# Patient Record
Sex: FEMALE | URBAN - METROPOLITAN AREA
[De-identification: names, ages, dates, MRNs, and addresses within clinical notes are randomized per-mention and may not be internally consistent; named-entity substitution may affect disease eponyms.]

---

## 2017-02-24 PROBLEM — Z00.00 ENCOUNTER FOR PREVENTIVE HEALTH EXAMINATION: Status: ACTIVE | Noted: 2017-02-24

## 2017-09-29 ENCOUNTER — INPATIENT (INPATIENT)
Facility: HOSPITAL | Age: 52
LOS: 4 days | Discharge: HOME | End: 2017-10-04
Attending: HOSPITALIST | Admitting: HOSPITALIST
Payer: MEDICAID

## 2017-09-29 VITALS
OXYGEN SATURATION: 95 % | SYSTOLIC BLOOD PRESSURE: 136 MMHG | WEIGHT: 110.01 LBS | RESPIRATION RATE: 17 BRPM | HEART RATE: 97 BPM | HEIGHT: 64 IN | DIASTOLIC BLOOD PRESSURE: 94 MMHG

## 2017-09-29 LAB
ALBUMIN SERPL ELPH-MCNC: 3.8 G/DL — SIGNIFICANT CHANGE UP (ref 3.3–5)
ALP SERPL-CCNC: 71 U/L — SIGNIFICANT CHANGE UP (ref 40–120)
ALT FLD-CCNC: 18 U/L — SIGNIFICANT CHANGE UP (ref 12–78)
ANION GAP SERPL CALC-SCNC: 9 MMOL/L — SIGNIFICANT CHANGE UP (ref 5–17)
APPEARANCE UR: CLEAR — SIGNIFICANT CHANGE UP
APTT BLD: 30.1 SEC — SIGNIFICANT CHANGE UP (ref 27.5–37.4)
AST SERPL-CCNC: 19 U/L — SIGNIFICANT CHANGE UP (ref 15–37)
BASOPHILS # BLD AUTO: 0 K/UL — SIGNIFICANT CHANGE UP (ref 0–0.2)
BASOPHILS NFR BLD AUTO: 0.4 % — SIGNIFICANT CHANGE UP (ref 0–2)
BILIRUB SERPL-MCNC: 0.3 MG/DL — SIGNIFICANT CHANGE UP (ref 0.2–1.2)
BILIRUB UR-MCNC: NEGATIVE — SIGNIFICANT CHANGE UP
BUN SERPL-MCNC: 11 MG/DL — SIGNIFICANT CHANGE UP (ref 7–23)
CALCIUM SERPL-MCNC: 8.7 MG/DL — SIGNIFICANT CHANGE UP (ref 8.5–10.1)
CHLORIDE SERPL-SCNC: 110 MMOL/L — HIGH (ref 96–108)
CK MB BLD-MCNC: 1.4 % — SIGNIFICANT CHANGE UP (ref 0–3.5)
CK MB CFR SERPL CALC: 1 NG/ML — SIGNIFICANT CHANGE UP (ref 0.5–3.6)
CK SERPL-CCNC: 74 U/L — SIGNIFICANT CHANGE UP (ref 26–192)
CO2 SERPL-SCNC: 27 MMOL/L — SIGNIFICANT CHANGE UP (ref 22–31)
COLOR SPEC: YELLOW — SIGNIFICANT CHANGE UP
CREAT SERPL-MCNC: 1.13 MG/DL — SIGNIFICANT CHANGE UP (ref 0.5–1.3)
DIFF PNL FLD: ABNORMAL
EOSINOPHIL # BLD AUTO: 0 K/UL — SIGNIFICANT CHANGE UP (ref 0–0.5)
EOSINOPHIL NFR BLD AUTO: 0.2 % — SIGNIFICANT CHANGE UP (ref 0–6)
GLUCOSE SERPL-MCNC: 78 MG/DL — SIGNIFICANT CHANGE UP (ref 70–99)
GLUCOSE UR QL: 50 MG/DL
HCT VFR BLD CALC: 39 % — SIGNIFICANT CHANGE UP (ref 34.5–45)
HGB BLD-MCNC: 13.1 G/DL — SIGNIFICANT CHANGE UP (ref 11.5–15.5)
INR BLD: 1.17 RATIO — HIGH (ref 0.88–1.16)
KETONES UR-MCNC: ABNORMAL
LACTATE SERPL-SCNC: 4.2 MMOL/L — CRITICAL HIGH (ref 0.7–2)
LEUKOCYTE ESTERASE UR-ACNC: NEGATIVE — SIGNIFICANT CHANGE UP
LYMPHOCYTES # BLD AUTO: 0.6 K/UL — LOW (ref 1–3.3)
LYMPHOCYTES # BLD AUTO: 5.9 % — LOW (ref 13–44)
MCHC RBC-ENTMCNC: 31.8 PG — SIGNIFICANT CHANGE UP (ref 27–34)
MCHC RBC-ENTMCNC: 33.4 GM/DL — SIGNIFICANT CHANGE UP (ref 32–36)
MCV RBC AUTO: 95 FL — SIGNIFICANT CHANGE UP (ref 80–100)
MONOCYTES # BLD AUTO: 0.6 K/UL — SIGNIFICANT CHANGE UP (ref 0–0.9)
MONOCYTES NFR BLD AUTO: 6.1 % — SIGNIFICANT CHANGE UP (ref 2–14)
NEUTROPHILS # BLD AUTO: 9.3 K/UL — HIGH (ref 1.8–7.4)
NEUTROPHILS NFR BLD AUTO: 87.4 % — HIGH (ref 43–77)
NITRITE UR-MCNC: NEGATIVE — SIGNIFICANT CHANGE UP
PH UR: 7 — SIGNIFICANT CHANGE UP (ref 5–8)
PLATELET # BLD AUTO: 263 K/UL — SIGNIFICANT CHANGE UP (ref 150–400)
POTASSIUM SERPL-MCNC: 2.8 MMOL/L — CRITICAL LOW (ref 3.5–5.3)
POTASSIUM SERPL-SCNC: 2.8 MMOL/L — CRITICAL LOW (ref 3.5–5.3)
PROT SERPL-MCNC: 7.4 GM/DL — SIGNIFICANT CHANGE UP (ref 6–8.3)
PROT UR-MCNC: 100 MG/DL
PROTHROM AB SERPL-ACNC: 12.8 SEC — HIGH (ref 9.8–12.7)
RBC # BLD: 4.11 M/UL — SIGNIFICANT CHANGE UP (ref 3.8–5.2)
RBC # FLD: 12.2 % — SIGNIFICANT CHANGE UP (ref 11–15)
SODIUM SERPL-SCNC: 146 MMOL/L — HIGH (ref 135–145)
SP GR SPEC: 1.01 — SIGNIFICANT CHANGE UP (ref 1.01–1.02)
TROPONIN I SERPL-MCNC: 0.19 NG/ML — HIGH (ref 0.01–0.04)
UROBILINOGEN FLD QL: NEGATIVE MG/DL — SIGNIFICANT CHANGE UP
WBC # BLD: 10.7 K/UL — HIGH (ref 3.8–10.5)
WBC # FLD AUTO: 10.7 K/UL — HIGH (ref 3.8–10.5)

## 2017-09-29 PROCEDURE — 99285 EMERGENCY DEPT VISIT HI MDM: CPT

## 2017-09-29 PROCEDURE — 71010: CPT | Mod: 26

## 2017-09-29 PROCEDURE — 70450 CT HEAD/BRAIN W/O DYE: CPT | Mod: 26

## 2017-09-29 RX ORDER — LEVETIRACETAM 250 MG/1
1000 TABLET, FILM COATED ORAL ONCE
Refills: 0 | Status: COMPLETED | OUTPATIENT
Start: 2017-09-29 | End: 2017-09-29

## 2017-09-29 RX ORDER — POTASSIUM CHLORIDE 20 MEQ
20 PACKET (EA) ORAL ONCE
Refills: 0 | Status: COMPLETED | OUTPATIENT
Start: 2017-09-29 | End: 2017-09-29

## 2017-09-29 RX ORDER — POTASSIUM CHLORIDE 20 MEQ
40 PACKET (EA) ORAL ONCE
Refills: 0 | Status: COMPLETED | OUTPATIENT
Start: 2017-09-29 | End: 2017-09-29

## 2017-09-29 RX ORDER — SODIUM CHLORIDE 9 MG/ML
1000 INJECTION INTRAMUSCULAR; INTRAVENOUS; SUBCUTANEOUS ONCE
Refills: 0 | Status: COMPLETED | OUTPATIENT
Start: 2017-09-29 | End: 2017-09-29

## 2017-09-29 RX ADMIN — Medication 50 MILLIEQUIVALENT(S): at 22:37

## 2017-09-29 RX ADMIN — SODIUM CHLORIDE 1000 MILLILITER(S): 9 INJECTION INTRAMUSCULAR; INTRAVENOUS; SUBCUTANEOUS at 21:23

## 2017-09-29 RX ADMIN — Medication 40 MILLIEQUIVALENT(S): at 22:37

## 2017-09-29 RX ADMIN — LEVETIRACETAM 400 MILLIGRAM(S): 250 TABLET, FILM COATED ORAL at 22:29

## 2017-09-29 NOTE — ED PROVIDER NOTE - OBJECTIVE STATEMENT
Pertinent PMH/PSH/FHx/SHx and Review of Systems contained within:  51 yo f with PMH of HTN and seizures BIBEMS for seizures today. Seizure witnessed by EMS and she received versed. In ED, patient post-ictal and not answering questions. Pertinent PMH/PSH/FHx/SHx and Review of Systems contained within:  53 yo f with PMH of hemorrhagic CVA with right sided weakness, HTN and seizures BIBEMS for seizures today. As per daughter, seizure lasted longer than 15 mintues until she received versed by EMS. Daughter reports that patient always has extended postictal period with right sided facial droop that eventual resolves. In ED, patient reports feeling well and has no complaints. No aggravating or relieving factors, No fever/chills, No photophobia/eye pain/changes in vision, No ear pain/sore throat/dysphagia, No chest pain/palpitations, no SOB/cough/wheeze/stridor, No abdominal pain, No N/V/D, no dysuria/frequency/discharge, No neck/back pain, no rash

## 2017-09-29 NOTE — ED PROVIDER NOTE - MEDICAL DECISION MAKING DETAILS
labs and imaging reviewed. patient received ivfs and keppra. she is now admitted to medicine for further management.

## 2017-09-30 DIAGNOSIS — Z98.890 OTHER SPECIFIED POSTPROCEDURAL STATES: Chronic | ICD-10-CM

## 2017-09-30 DIAGNOSIS — Z87.74 PERSONAL HISTORY OF (CORRECTED) CONGENITAL MALFORMATIONS OF HEART AND CIRCULATORY SYSTEM: ICD-10-CM

## 2017-09-30 DIAGNOSIS — G83.84 TODD'S PARALYSIS (POSTEPILEPTIC): ICD-10-CM

## 2017-09-30 DIAGNOSIS — I69.392 FACIAL WEAKNESS FOLLOWING CEREBRAL INFARCTION: ICD-10-CM

## 2017-09-30 DIAGNOSIS — R56.9 UNSPECIFIED CONVULSIONS: ICD-10-CM

## 2017-09-30 DIAGNOSIS — G40.901 EPILEPSY, UNSPECIFIED, NOT INTRACTABLE, WITH STATUS EPILEPTICUS: ICD-10-CM

## 2017-09-30 DIAGNOSIS — Z98.891 HISTORY OF UTERINE SCAR FROM PREVIOUS SURGERY: Chronic | ICD-10-CM

## 2017-09-30 DIAGNOSIS — I10 ESSENTIAL (PRIMARY) HYPERTENSION: ICD-10-CM

## 2017-09-30 LAB
ALBUMIN SERPL ELPH-MCNC: 3.6 G/DL — SIGNIFICANT CHANGE UP (ref 3.3–5)
ALP SERPL-CCNC: 69 U/L — SIGNIFICANT CHANGE UP (ref 40–120)
ALT FLD-CCNC: 16 U/L — SIGNIFICANT CHANGE UP (ref 12–78)
ANION GAP SERPL CALC-SCNC: 6 MMOL/L — SIGNIFICANT CHANGE UP (ref 5–17)
APPEARANCE UR: ABNORMAL
AST SERPL-CCNC: 18 U/L — SIGNIFICANT CHANGE UP (ref 15–37)
BACTERIA # UR AUTO: ABNORMAL
BACTERIA # UR AUTO: ABNORMAL
BILIRUB SERPL-MCNC: 0.5 MG/DL — SIGNIFICANT CHANGE UP (ref 0.2–1.2)
BILIRUB UR-MCNC: ABNORMAL
BUN SERPL-MCNC: 9 MG/DL — SIGNIFICANT CHANGE UP (ref 7–23)
CALCIUM SERPL-MCNC: 8.3 MG/DL — LOW (ref 8.5–10.1)
CHLORIDE SERPL-SCNC: 111 MMOL/L — HIGH (ref 96–108)
CO2 SERPL-SCNC: 28 MMOL/L — SIGNIFICANT CHANGE UP (ref 22–31)
COLOR SPEC: YELLOW — SIGNIFICANT CHANGE UP
COMMENT - URINE: SIGNIFICANT CHANGE UP
CREAT SERPL-MCNC: 0.84 MG/DL — SIGNIFICANT CHANGE UP (ref 0.5–1.3)
DIFF PNL FLD: ABNORMAL
EPI CELLS # UR: ABNORMAL
EPI CELLS # UR: ABNORMAL
GLUCOSE SERPL-MCNC: 85 MG/DL — SIGNIFICANT CHANGE UP (ref 70–99)
GLUCOSE UR QL: NEGATIVE MG/DL — SIGNIFICANT CHANGE UP
HCT VFR BLD CALC: 40 % — SIGNIFICANT CHANGE UP (ref 34.5–45)
HGB BLD-MCNC: 13.3 G/DL — SIGNIFICANT CHANGE UP (ref 11.5–15.5)
HYALINE CASTS # UR AUTO: ABNORMAL /LPF
KETONES UR-MCNC: ABNORMAL
LACTATE SERPL-SCNC: 1.4 MMOL/L — SIGNIFICANT CHANGE UP (ref 0.7–2)
LEUKOCYTE ESTERASE UR-ACNC: ABNORMAL
MCHC RBC-ENTMCNC: 32 PG — SIGNIFICANT CHANGE UP (ref 27–34)
MCHC RBC-ENTMCNC: 33.2 GM/DL — SIGNIFICANT CHANGE UP (ref 32–36)
MCV RBC AUTO: 96.4 FL — SIGNIFICANT CHANGE UP (ref 80–100)
NITRITE UR-MCNC: NEGATIVE — SIGNIFICANT CHANGE UP
PH UR: 6 — SIGNIFICANT CHANGE UP (ref 5–8)
PLATELET # BLD AUTO: 245 K/UL — SIGNIFICANT CHANGE UP (ref 150–400)
POTASSIUM SERPL-MCNC: 3.5 MMOL/L — SIGNIFICANT CHANGE UP (ref 3.5–5.3)
POTASSIUM SERPL-SCNC: 3.5 MMOL/L — SIGNIFICANT CHANGE UP (ref 3.5–5.3)
PROT SERPL-MCNC: 7.1 GM/DL — SIGNIFICANT CHANGE UP (ref 6–8.3)
PROT UR-MCNC: 30 MG/DL
RBC # BLD: 4.15 M/UL — SIGNIFICANT CHANGE UP (ref 3.8–5.2)
RBC # FLD: 12.4 % — SIGNIFICANT CHANGE UP (ref 11–15)
RBC CASTS # UR COMP ASSIST: ABNORMAL /HPF (ref 0–4)
RBC CASTS # UR COMP ASSIST: ABNORMAL /HPF (ref 0–4)
SODIUM SERPL-SCNC: 145 MMOL/L — SIGNIFICANT CHANGE UP (ref 135–145)
SP GR SPEC: 1.02 — SIGNIFICANT CHANGE UP (ref 1.01–1.02)
TROPONIN I SERPL-MCNC: 0.38 NG/ML — HIGH (ref 0.01–0.04)
TROPONIN I SERPL-MCNC: 0.61 NG/ML — HIGH (ref 0.01–0.04)
UROBILINOGEN FLD QL: 4 MG/DL
WBC # BLD: 15.5 K/UL — HIGH (ref 3.8–10.5)
WBC # FLD AUTO: 15.5 K/UL — HIGH (ref 3.8–10.5)
WBC UR QL: SIGNIFICANT CHANGE UP
WBC UR QL: SIGNIFICANT CHANGE UP

## 2017-09-30 PROCEDURE — 99222 1ST HOSP IP/OBS MODERATE 55: CPT | Mod: AI

## 2017-09-30 RX ORDER — ASPIRIN/CALCIUM CARB/MAGNESIUM 324 MG
325 TABLET ORAL ONCE
Refills: 0 | Status: COMPLETED | OUTPATIENT
Start: 2017-09-30 | End: 2017-09-30

## 2017-09-30 RX ORDER — INFLUENZA VIRUS VACCINE 15; 15; 15; 15 UG/.5ML; UG/.5ML; UG/.5ML; UG/.5ML
0.5 SUSPENSION INTRAMUSCULAR ONCE
Refills: 0 | Status: COMPLETED | OUTPATIENT
Start: 2017-09-30 | End: 2017-10-04

## 2017-09-30 RX ORDER — LEVETIRACETAM 250 MG/1
750 TABLET, FILM COATED ORAL EVERY 12 HOURS
Refills: 0 | Status: DISCONTINUED | OUTPATIENT
Start: 2017-09-30 | End: 2017-10-02

## 2017-09-30 RX ORDER — ASPIRIN/CALCIUM CARB/MAGNESIUM 324 MG
81 TABLET ORAL DAILY
Refills: 0 | Status: DISCONTINUED | OUTPATIENT
Start: 2017-10-01 | End: 2017-10-04

## 2017-09-30 RX ADMIN — LEVETIRACETAM 400 MILLIGRAM(S): 250 TABLET, FILM COATED ORAL at 07:02

## 2017-09-30 RX ADMIN — Medication 325 MILLIGRAM(S): at 07:56

## 2017-09-30 RX ADMIN — LEVETIRACETAM 400 MILLIGRAM(S): 250 TABLET, FILM COATED ORAL at 17:19

## 2017-09-30 NOTE — CONSULT NOTE ADULT - ASSESSMENT
consult dict awake alert left side aneurysm clipped seziure on keppra  c head noted  r sided weakness   will follow

## 2017-09-30 NOTE — H&P ADULT - ASSESSMENT
52 YOF with PMHx of seizure, HTN, hemorrhagic CVA with right sided weakness, history of AVM (with craniotomy) now admitted with seizures.

## 2017-09-30 NOTE — H&P ADULT - HISTORY OF PRESENT ILLNESS
52 YOF with PMHx of HTN, hemorrhagic CVA with right sided weakness, history of AVM (with craniotomy) and history of seizures BIB EMS for one episode of seizure. As per chart the daughter stated seizure lasted longer than 15 minutes until she medicated with Versed by EMS. Daughter reports that patient always has extended postictal period with right sided facial droop that eventual resolves. Pt now post ictal, lethargic, unable to obtain full history from pt, continues to go in and out of sleep. However states she has no complaints. States last seizure episode was about 3-4months ago. Otherwise denies cp, sob, palpitations, N/V, abd pain, headache, dizziness.    In ED labs show hypokalemia: 2.8. Repleted. Elevated Lactate: 4.2. Given 1L bolus. Troponin: 0.192. UA with moderate bacteria and ketones. CT Head negative for acute changes, left craniotomy. Keppra 1g given in ED. CXR pending.    ED attending Dr. Staton d/w Dr. Del Rio who admitted pt.

## 2017-09-30 NOTE — H&P ADULT - PMH
CVA, old, facial weakness  Right sided weakness  History of arteriovenous malformation (AVM)  s/p craniotomy  HTN (hypertension)    Seizure

## 2017-09-30 NOTE — H&P ADULT - PROBLEM SELECTOR PLAN 1
- Admit to Telemetry  - Seizure precautions  - Keppra 750mg IV BID  - Neurology consult  - F/u labs  - Repeat lactate @500  - Repeat Troponin @500/1300  - Keppra level pending  - Speech and Swallow

## 2017-09-30 NOTE — H&P ADULT - NSHPLABSRESULTS_GEN_ALL_CORE
13.1   10.7  )-----------( 263      ( 29 Sep 2017 21:25 )             39.0       146<H>  |  110<H>  |  11  ----------------------------<  78  2.8<LL>   |  27  |  1.13    Ca    8.7      29 Sep 2017 21:25    TPro  7.4  /  Alb  3.8  /  TBili  0.3  /  DBili  x   /  AST  19  /  ALT  18  /  AlkPhos  71      PT/INR - ( 29 Sep 2017 21:25 )   PT: 12.8 sec;   INR: 1.17 ratio      PTT - ( 29 Sep 2017 21:25 )  PTT:30.1 sec    Lactate, Blood: 4.2    CARDIAC MARKERS ( 29 Sep 2017 21:25 )  .192 ng/mL / x     / 74 U/L / x     / 1.0 ng/mL    Urinalysis Basic - ( 29 Sep 2017 23:51 )    Color: Yellow / Appearance: Clear / S.015 / pH: x  Gluc: x / Ketone: Trace  / Bili: Negative / Urobili: Negative mg/dL   Blood: x / Protein: 100 mg/dL / Nitrite: Negative   Leuk Esterase: Negative / RBC: 6-10 /HPF / WBC 3-5   Sq Epi: x / Non Sq Epi: Many / Bacteria: Moderate    CT Head No Cont (17 @ 21:54) >  Left pterional craniotomy with subjacent encephalomalacia/gliosis and   chronic right frontal lobe infarct.  No intracranial hemorrhage or mass effect.    CXR pending

## 2017-09-30 NOTE — H&P ADULT - NSHPREVIEWOFSYSTEMS_GEN_ALL_CORE
REVIEW OF SYSTEMS: POST ICTAL and Lethargic, denied any complaints  CONSTITUTIONAL: No fever, weight loss, or fatigue  EYES: No eye pain, visual disturbances, or discharge  ENMT:  No difficulty hearing, tinnitus, vertigo; No sinus or throat pain  NECK: No pain or stiffness  BREASTS: No pain, masses, or nipple discharge  RESPIRATORY: No cough, wheezing, chills or hemoptysis; No shortness of breath  CARDIOVASCULAR: No chest pain, palpitations, dizziness, or leg swelling  GASTROINTESTINAL: No abdominal or epigastric pain. No nausea, vomiting, or hematemesis; No diarrhea or constipation. No melena or hematochezia.  GENITOURINARY: No dysuria, No frequency, No hematuria, No incontinence  NEUROLOGICAL: No headaches, memory loss, loss of strength, numbness, or tremors  SKIN: No itching, burning, rashes, or lesions   LYMPH NODES: No enlarged glands  ENDOCRINE: No heat or cold intolerance; No hair loss  MUSCULOSKELETAL: No joint pain or swelling; No muscle, back, or extremity pain  PSYCHIATRIC: No depression, anxiety, mood swings, or difficulty sleeping  HEME/LYMPH: No easy bruising, or bleeding gums  ALLERGY AND IMMUNOLOGIC: No hives or eczema

## 2017-09-30 NOTE — CONSULT NOTE ADULT - SUBJECTIVE AND OBJECTIVE BOX
HPI:  HPI:  52 YOF with PMHx of HTN, hemorrhagic CVA with right sided weakness, history of AVM (with craniotomy) and history of seizures BIB EMS for one episode of seizure. As per chart the daughter stated seizure lasted longer than 15 minutes until she medicated with Versed by EMS. Daughter reports that patient always has extended postictal period with right sided facial droop that eventual resolves. Pt now post ictal, lethargic, unable to obtain full history from pt, continues to go in and out of sleep. However states she has no complaints. States last seizure episode was about 3-4months ago. Otherwise denies cp, sob, palpitations, N/V, abd pain, headache, dizziness.    In ED labs show hypokalemia: 2.8. Repleted. Elevated Lactate: 4.2. Given 1L bolus. Troponin: 0.192. UA with moderate bacteria and ketones. CT Head negative for acute changes, left craniotomy. Keppra 1g given in ED. CXR pending.    ED attending Dr. Staton d/w Dr. Del Rio who admitted pt. (30 Sep 2017 04:33)      Chief Complaint:  Patient is a 52y old  Female who presents with a chief complaint of seizures (30 Sep 2017 04:33)      Review of Systems:    see above           Social History/Family History  SOCHX:   tobacco,  -  alcohol    FMHX: FA/MO  - contributory       Discussed with:  PMD, Family    Physical Exam:    Vital Signs:  Vital Signs Last 24 Hrs  T(C): 37.7 (30 Sep 2017 16:20), Max: 37.7 (30 Sep 2017 04:51)  T(F): 99.8 (30 Sep 2017 16:20), Max: 99.9 (30 Sep 2017 04:51)  HR: 72 (30 Sep 2017 16:20) (65 - 79)  BP: 132/82 (30 Sep 2017 16:20) (132/82 - 159/90)  BP(mean): --  RR: 16 (30 Sep 2017 16:20) (15 - 25)  SpO2: 98% (30 Sep 2017 16:20) (95% - 98%)  Daily Height in cm: 157.48 (30 Sep 2017 05:31)    Daily   I&O's Summary        Chest:  Full & symmetric excursion, no increased effort, breath sounds clear  Cardiovascular:  Regular rhythm, S1, S2, no murmur/rub/S3/S4, no carotid/femoral/abdominal bruit, radial/pedal pulses 2+, no edema  Abdomen:  Soft, non-tender, non-distended, normoactive bowel sounds, no HSM  Extremities:  Gait & station:   Digits:   Nails:   Joints, Bones, Muscles:   ROM:   Stability:      Laboratory:                          13.3   15.5  )-----------( 245      ( 30 Sep 2017 05:35 )             40.0     09-30    145  |  111<H>  |  9   ----------------------------<  85  3.5   |  28  |  0.84    Ca    8.3<L>      30 Sep 2017 05:35    TPro  7.1  /  Alb  3.6  /  TBili  0.5  /  DBili  x   /  AST  18  /  ALT  16  /  AlkPhos  69  09-30      CARDIAC MARKERS ( 30 Sep 2017 13:53 )  .381 ng/mL / x     / x     / x     / x      CARDIAC MARKERS ( 30 Sep 2017 05:35 )  .614 ng/mL / x     / x     / x     / x      CARDIAC MARKERS ( 29 Sep 2017 21:25 )  .192 ng/mL / x     / 74 U/L / x     / 1.0 ng/mL      CAPILLARY BLOOD GLUCOSE        LIVER FUNCTIONS - ( 30 Sep 2017 05:35 )  Alb: 3.6 g/dL / Pro: 7.1 gm/dL / ALK PHOS: 69 U/L / ALT: 16 U/L / AST: 18 U/L / GGT: x           PT/INR - ( 29 Sep 2017 21:25 )   PT: 12.8 sec;   INR: 1.17 ratio         PTT - ( 29 Sep 2017 21:25 )  PTT:30.1 sec  Urinalysis Basic - ( 29 Sep 2017 23:51 )    Color: Yellow / Appearance: Clear / S.015 / pH: x  Gluc: x / Ketone: Trace  / Bili: Negative / Urobili: Negative mg/dL   Blood: x / Protein: 100 mg/dL / Nitrite: Negative   Leuk Esterase: Negative / RBC: 6-10 /HPF / WBC 3-5   Sq Epi: x / Non Sq Epi: Many / Bacteria: Moderate          Assessment:    Seizure  Troponin mild elevation, normal CPK, TELE noted  Neuro noted  Not AMI, CV stable

## 2017-09-30 NOTE — CHART NOTE - NSCHARTNOTEFT_GEN_A_CORE
Patient was seen and examined bedside  Feeling better  No chest pain, SOB  No headache  Awake alert  Bilaterally clear lungs  No rales   No edema LE  A/p  1. status epilepticus  2. HTN  3. Daryl paralysis  AV malformation  awaiting Neurology follow up.

## 2017-09-30 NOTE — H&P ADULT - NSHPPHYSICALEXAM_GEN_ALL_CORE
Physical Exam:   GENERAL: post ictal, lethargic, well-groomed, well-developed  HEAD:  Atraumatic, Normocephalic  EYES: EOMI, PERRLA, conjunctiva and sclera clear  ENMT: No tonsillar erythema, exudates, or enlargement; Moist mucous membranes, No lesions  NECK: Supple, No JVD  NERVOUS SYSTEM:  lethargic, post ictal, decreased Motor Strength on R side, good finger grasp  CHEST/LUNG: Clear to percussion bilaterally; No rales, rhonchi, wheezing, or rubs  HEART: Regular rate and rhythm; No murmurs  ABDOMEN: Soft, Nontender, Nondistended; Bowel sounds present  EXTREMITIES:  2+ Peripheral Pulses, No clubbing, cyanosis, or edema  LYMPH: No lymphadenopathy noted  SKIN: No rashes or lesions

## 2017-10-01 DIAGNOSIS — E87.6 HYPOKALEMIA: ICD-10-CM

## 2017-10-01 DIAGNOSIS — M62.82 RHABDOMYOLYSIS: ICD-10-CM

## 2017-10-01 DIAGNOSIS — R74.8 ABNORMAL LEVELS OF OTHER SERUM ENZYMES: ICD-10-CM

## 2017-10-01 LAB
ANION GAP SERPL CALC-SCNC: 9 MMOL/L — SIGNIFICANT CHANGE UP (ref 5–17)
BUN SERPL-MCNC: 15 MG/DL — SIGNIFICANT CHANGE UP (ref 7–23)
CALCIUM SERPL-MCNC: 8.3 MG/DL — LOW (ref 8.5–10.1)
CHLORIDE SERPL-SCNC: 109 MMOL/L — HIGH (ref 96–108)
CK SERPL-CCNC: 937 U/L — HIGH (ref 26–192)
CO2 SERPL-SCNC: 25 MMOL/L — SIGNIFICANT CHANGE UP (ref 22–31)
CREAT SERPL-MCNC: 0.87 MG/DL — SIGNIFICANT CHANGE UP (ref 0.5–1.3)
GLUCOSE SERPL-MCNC: 77 MG/DL — SIGNIFICANT CHANGE UP (ref 70–99)
HCT VFR BLD CALC: 35.5 % — SIGNIFICANT CHANGE UP (ref 34.5–45)
HGB BLD-MCNC: 12 G/DL — SIGNIFICANT CHANGE UP (ref 11.5–15.5)
MCHC RBC-ENTMCNC: 31.7 PG — SIGNIFICANT CHANGE UP (ref 27–34)
MCHC RBC-ENTMCNC: 33.7 GM/DL — SIGNIFICANT CHANGE UP (ref 32–36)
MCV RBC AUTO: 94.1 FL — SIGNIFICANT CHANGE UP (ref 80–100)
PLATELET # BLD AUTO: 217 K/UL — SIGNIFICANT CHANGE UP (ref 150–400)
POTASSIUM SERPL-MCNC: 3.4 MMOL/L — LOW (ref 3.5–5.3)
POTASSIUM SERPL-SCNC: 3.4 MMOL/L — LOW (ref 3.5–5.3)
RBC # BLD: 3.77 M/UL — LOW (ref 3.8–5.2)
RBC # FLD: 12.1 % — SIGNIFICANT CHANGE UP (ref 11–15)
SODIUM SERPL-SCNC: 143 MMOL/L — SIGNIFICANT CHANGE UP (ref 135–145)
WBC # BLD: 8.8 K/UL — SIGNIFICANT CHANGE UP (ref 3.8–10.5)
WBC # FLD AUTO: 8.8 K/UL — SIGNIFICANT CHANGE UP (ref 3.8–10.5)

## 2017-10-01 PROCEDURE — 99232 SBSQ HOSP IP/OBS MODERATE 35: CPT

## 2017-10-01 RX ORDER — POTASSIUM CHLORIDE 20 MEQ
40 PACKET (EA) ORAL EVERY 4 HOURS
Refills: 0 | Status: COMPLETED | OUTPATIENT
Start: 2017-10-01 | End: 2017-10-01

## 2017-10-01 RX ORDER — SODIUM CHLORIDE 9 MG/ML
1000 INJECTION INTRAMUSCULAR; INTRAVENOUS; SUBCUTANEOUS
Refills: 0 | Status: DISCONTINUED | OUTPATIENT
Start: 2017-10-01 | End: 2017-10-02

## 2017-10-01 RX ORDER — LISINOPRIL 2.5 MG/1
40 TABLET ORAL DAILY
Refills: 0 | Status: DISCONTINUED | OUTPATIENT
Start: 2017-10-01 | End: 2017-10-03

## 2017-10-01 RX ORDER — OLANZAPINE 15 MG/1
20 TABLET, FILM COATED ORAL DAILY
Refills: 0 | Status: DISCONTINUED | OUTPATIENT
Start: 2017-10-01 | End: 2017-10-04

## 2017-10-01 RX ADMIN — LEVETIRACETAM 400 MILLIGRAM(S): 250 TABLET, FILM COATED ORAL at 17:31

## 2017-10-01 RX ADMIN — Medication 81 MILLIGRAM(S): at 12:46

## 2017-10-01 RX ADMIN — LISINOPRIL 40 MILLIGRAM(S): 2.5 TABLET ORAL at 17:32

## 2017-10-01 RX ADMIN — OLANZAPINE 20 MILLIGRAM(S): 15 TABLET, FILM COATED ORAL at 21:17

## 2017-10-01 RX ADMIN — LEVETIRACETAM 400 MILLIGRAM(S): 250 TABLET, FILM COATED ORAL at 05:10

## 2017-10-01 RX ADMIN — SODIUM CHLORIDE 60 MILLILITER(S): 9 INJECTION INTRAMUSCULAR; INTRAVENOUS; SUBCUTANEOUS at 10:25

## 2017-10-01 RX ADMIN — Medication 40 MILLIEQUIVALENT(S): at 10:26

## 2017-10-01 RX ADMIN — Medication 40 MILLIEQUIVALENT(S): at 17:31

## 2017-10-02 LAB
ANION GAP SERPL CALC-SCNC: 5 MMOL/L — SIGNIFICANT CHANGE UP (ref 5–17)
BUN SERPL-MCNC: 10 MG/DL — SIGNIFICANT CHANGE UP (ref 7–23)
CALCIUM SERPL-MCNC: 8.4 MG/DL — LOW (ref 8.5–10.1)
CHLORIDE SERPL-SCNC: 118 MMOL/L — HIGH (ref 96–108)
CK SERPL-CCNC: 1774 U/L — HIGH (ref 26–192)
CO2 SERPL-SCNC: 24 MMOL/L — SIGNIFICANT CHANGE UP (ref 22–31)
CREAT SERPL-MCNC: 0.8 MG/DL — SIGNIFICANT CHANGE UP (ref 0.5–1.3)
GLUCOSE SERPL-MCNC: 83 MG/DL — SIGNIFICANT CHANGE UP (ref 70–99)
HCT VFR BLD CALC: 37.2 % — SIGNIFICANT CHANGE UP (ref 34.5–45)
HGB BLD-MCNC: 12.2 G/DL — SIGNIFICANT CHANGE UP (ref 11.5–15.5)
LEVETIRACETAM SERPL-MCNC: 28.7 MCG/ML — SIGNIFICANT CHANGE UP (ref 12–46)
MCHC RBC-ENTMCNC: 31.8 PG — SIGNIFICANT CHANGE UP (ref 27–34)
MCHC RBC-ENTMCNC: 32.7 GM/DL — SIGNIFICANT CHANGE UP (ref 32–36)
MCV RBC AUTO: 97 FL — SIGNIFICANT CHANGE UP (ref 80–100)
PLATELET # BLD AUTO: 211 K/UL — SIGNIFICANT CHANGE UP (ref 150–400)
POTASSIUM SERPL-MCNC: 4.3 MMOL/L — SIGNIFICANT CHANGE UP (ref 3.5–5.3)
POTASSIUM SERPL-SCNC: 4.3 MMOL/L — SIGNIFICANT CHANGE UP (ref 3.5–5.3)
RBC # BLD: 3.83 M/UL — SIGNIFICANT CHANGE UP (ref 3.8–5.2)
RBC # FLD: 12.1 % — SIGNIFICANT CHANGE UP (ref 11–15)
SODIUM SERPL-SCNC: 147 MMOL/L — HIGH (ref 135–145)
WBC # BLD: 7.5 K/UL — SIGNIFICANT CHANGE UP (ref 3.8–10.5)
WBC # FLD AUTO: 7.5 K/UL — SIGNIFICANT CHANGE UP (ref 3.8–10.5)

## 2017-10-02 PROCEDURE — 99233 SBSQ HOSP IP/OBS HIGH 50: CPT

## 2017-10-02 RX ORDER — LEVETIRACETAM 250 MG/1
750 TABLET, FILM COATED ORAL
Refills: 0 | Status: DISCONTINUED | OUTPATIENT
Start: 2017-10-02 | End: 2017-10-04

## 2017-10-02 RX ORDER — SODIUM CHLORIDE 9 MG/ML
1000 INJECTION INTRAMUSCULAR; INTRAVENOUS; SUBCUTANEOUS
Refills: 0 | Status: DISCONTINUED | OUTPATIENT
Start: 2017-10-02 | End: 2017-10-02

## 2017-10-02 RX ADMIN — OLANZAPINE 20 MILLIGRAM(S): 15 TABLET, FILM COATED ORAL at 21:35

## 2017-10-02 RX ADMIN — LISINOPRIL 40 MILLIGRAM(S): 2.5 TABLET ORAL at 05:37

## 2017-10-02 RX ADMIN — Medication 81 MILLIGRAM(S): at 12:22

## 2017-10-02 RX ADMIN — LEVETIRACETAM 400 MILLIGRAM(S): 250 TABLET, FILM COATED ORAL at 05:38

## 2017-10-02 RX ADMIN — LEVETIRACETAM 400 MILLIGRAM(S): 250 TABLET, FILM COATED ORAL at 17:07

## 2017-10-02 RX ADMIN — SODIUM CHLORIDE 60 MILLILITER(S): 9 INJECTION INTRAMUSCULAR; INTRAVENOUS; SUBCUTANEOUS at 05:38

## 2017-10-02 NOTE — SWALLOW BEDSIDE ASSESSMENT ADULT - COMMENTS
CXR 9/29/2017IMPRESSION:No focal air space opacities.    CT head 9/29/2017 IMPRESSION: Left pterional craniotomy with subjacent encephalomalacia/gliosis and chronic right frontal lobe infarct.  No intracranial hemorrhage or mass effect.

## 2017-10-02 NOTE — SWALLOW BEDSIDE ASSESSMENT ADULT - SLP PERTINENT HISTORY OF CURRENT PROBLEM
52 YOF with PMHx of HTN, hemorrhagic CVA with right sided weakness, history of AVM (with craniotomy) and history of seizures BIB EMS for one episode of seizure. As per chart the daughter stated seizure lasted longer than 15 minutes until she medicated with Versed by EMS. Daughter reports that patient always has extended postictal period with right sided facial droop that eventual resolves. Pt now post ictal, lethargic, unable to obtain full history from pt, continues to go in and out of sleep. However states she has no complaints. States last seizure episode was about 3-4months ago. Otherwise denies cp, sob, palpitations, N/V, abd pain, headache, dizziness. In ED labs show hypokalemia: 2.8. Repleted. Elevated Lactate: 4.2. Given 1L bolus. Troponin: 0.192. UA with moderate bacteria and ketones. CT Head negative for acute changes, left craniotomy. Keppra 1g given in ED. CXR pending. ED attending Dr. Staton d/w Dr. Del Rio who admitted pt. (30 Sep 2017 04:33)

## 2017-10-03 LAB
ALBUMIN SERPL ELPH-MCNC: 3.4 G/DL — SIGNIFICANT CHANGE UP (ref 3.3–5)
ALP SERPL-CCNC: 60 U/L — SIGNIFICANT CHANGE UP (ref 40–120)
ALT FLD-CCNC: 23 U/L — SIGNIFICANT CHANGE UP (ref 12–78)
ANION GAP SERPL CALC-SCNC: 10 MMOL/L — SIGNIFICANT CHANGE UP (ref 5–17)
AST SERPL-CCNC: 68 U/L — HIGH (ref 15–37)
BILIRUB SERPL-MCNC: 0.5 MG/DL — SIGNIFICANT CHANGE UP (ref 0.2–1.2)
BUN SERPL-MCNC: 11 MG/DL — SIGNIFICANT CHANGE UP (ref 7–23)
CALCIUM SERPL-MCNC: 8.9 MG/DL — SIGNIFICANT CHANGE UP (ref 8.5–10.1)
CHLORIDE SERPL-SCNC: 113 MMOL/L — HIGH (ref 96–108)
CK SERPL-CCNC: 2776 U/L — HIGH (ref 26–192)
CO2 SERPL-SCNC: 23 MMOL/L — SIGNIFICANT CHANGE UP (ref 22–31)
CREAT SERPL-MCNC: 0.8 MG/DL — SIGNIFICANT CHANGE UP (ref 0.5–1.3)
GLUCOSE SERPL-MCNC: 80 MG/DL — SIGNIFICANT CHANGE UP (ref 70–99)
HCT VFR BLD CALC: 37.8 % — SIGNIFICANT CHANGE UP (ref 34.5–45)
HGB BLD-MCNC: 12.5 G/DL — SIGNIFICANT CHANGE UP (ref 11.5–15.5)
MAGNESIUM SERPL-MCNC: 2.4 MG/DL — SIGNIFICANT CHANGE UP (ref 1.6–2.6)
MCHC RBC-ENTMCNC: 31.5 PG — SIGNIFICANT CHANGE UP (ref 27–34)
MCHC RBC-ENTMCNC: 33 GM/DL — SIGNIFICANT CHANGE UP (ref 32–36)
MCV RBC AUTO: 95.5 FL — SIGNIFICANT CHANGE UP (ref 80–100)
OXCARBAZEPINE SERPL-MCNC: 32 UG/ML — SIGNIFICANT CHANGE UP (ref 10–35)
PLATELET # BLD AUTO: 233 K/UL — SIGNIFICANT CHANGE UP (ref 150–400)
POTASSIUM SERPL-MCNC: 4 MMOL/L — SIGNIFICANT CHANGE UP (ref 3.5–5.3)
POTASSIUM SERPL-SCNC: 4 MMOL/L — SIGNIFICANT CHANGE UP (ref 3.5–5.3)
PROT SERPL-MCNC: 7 GM/DL — SIGNIFICANT CHANGE UP (ref 6–8.3)
RBC # BLD: 3.96 M/UL — SIGNIFICANT CHANGE UP (ref 3.8–5.2)
RBC # FLD: 12 % — SIGNIFICANT CHANGE UP (ref 11–15)
SODIUM SERPL-SCNC: 146 MMOL/L — HIGH (ref 135–145)
WBC # BLD: 7 K/UL — SIGNIFICANT CHANGE UP (ref 3.8–10.5)
WBC # FLD AUTO: 7 K/UL — SIGNIFICANT CHANGE UP (ref 3.8–10.5)

## 2017-10-03 PROCEDURE — 99233 SBSQ HOSP IP/OBS HIGH 50: CPT

## 2017-10-03 RX ORDER — SODIUM CHLORIDE 9 MG/ML
1000 INJECTION, SOLUTION INTRAVENOUS
Refills: 0 | Status: DISCONTINUED | OUTPATIENT
Start: 2017-10-03 | End: 2017-10-04

## 2017-10-03 RX ORDER — OXCARBAZEPINE 300 MG/1
300 TABLET, FILM COATED ORAL
Refills: 0 | Status: DISCONTINUED | OUTPATIENT
Start: 2017-10-03 | End: 2017-10-04

## 2017-10-03 RX ORDER — LISINOPRIL 2.5 MG/1
40 TABLET ORAL DAILY
Refills: 0 | Status: DISCONTINUED | OUTPATIENT
Start: 2017-10-03 | End: 2017-10-04

## 2017-10-03 RX ADMIN — LEVETIRACETAM 750 MILLIGRAM(S): 250 TABLET, FILM COATED ORAL at 06:33

## 2017-10-03 RX ADMIN — OLANZAPINE 20 MILLIGRAM(S): 15 TABLET, FILM COATED ORAL at 22:58

## 2017-10-03 RX ADMIN — Medication 81 MILLIGRAM(S): at 11:05

## 2017-10-03 RX ADMIN — SODIUM CHLORIDE 125 MILLILITER(S): 9 INJECTION, SOLUTION INTRAVENOUS at 22:59

## 2017-10-03 RX ADMIN — LEVETIRACETAM 750 MILLIGRAM(S): 250 TABLET, FILM COATED ORAL at 17:15

## 2017-10-03 RX ADMIN — LISINOPRIL 40 MILLIGRAM(S): 2.5 TABLET ORAL at 07:40

## 2017-10-03 RX ADMIN — SODIUM CHLORIDE 125 MILLILITER(S): 9 INJECTION, SOLUTION INTRAVENOUS at 09:47

## 2017-10-03 RX ADMIN — OXCARBAZEPINE 300 MILLIGRAM(S): 300 TABLET, FILM COATED ORAL at 19:12

## 2017-10-03 NOTE — PROGRESS NOTE ADULT - PROBLEM SELECTOR PLAN 7
Cardiology evaluation appreciated- high trop - no AMI per card

## 2017-10-03 NOTE — PROGRESS NOTE ADULT - PROBLEM SELECTOR PLAN 6
High CPK secondary to seizure ivf given for 24 hours
High CPK secondary to seizure ivf continued
High CPK secondary to seizure- start IVF

## 2017-10-03 NOTE — PHYSICAL THERAPY INITIAL EVALUATION ADULT - MODIFIED CLINICAL TEST OF SENSORY INTEGRATION IN BALANCE TEST
Barthel Index: Feeding Score 10/10, Bathing Score 5/5, Grooming Score 5/5, Dressing Score 10/10, Bowels Score 10/10, Bladder Score 5/10, Toilet Score 10/10, Transfers Score 15/15, Mobility Score 10/15, Stairs Score 5/10,     Total Score 85/100

## 2017-10-03 NOTE — PHYSICAL THERAPY INITIAL EVALUATION ADULT - TINETTI BALANCE TEST, REHAB EVAL
Sitting Balance -1 /1 , Rises From Chair -1 /2, Attempts to rise -2 /2 , Immediate Standing Balance - 2/2,  Standing Balance - 2/2, Nudged -  2/2, Eyes Closed -0 /1,  Turning 360 Deg - 1 /2, Sitting down - 2/2, Balance Score - 13/16

## 2017-10-03 NOTE — PHYSICAL THERAPY INITIAL EVALUATION ADULT - IMPAIRMENTS FOUND, PT EVAL
aerobic capacity/endurance/ergonomics and body mechanics/gait, locomotion, and balance/gross motor/muscle strength/posture

## 2017-10-03 NOTE — PHYSICAL THERAPY INITIAL EVALUATION ADULT - TINETTI GAIT TEST, REHAB EVAL
Indication of gait -1/1,   Step Length and height -2/2,   Foot Clearance -2/2,   Step Symmetry - 1/1,  Step Continuity -1/1,   Path 1/ 2,   Trunk -2/2,   Walking Time -1/1,   Total Score - 11/12

## 2017-10-03 NOTE — PHYSICAL THERAPY INITIAL EVALUATION ADULT - ADDITIONAL COMMENTS
patient lives with children & grandchildren in 2nd floor walk up apartment in private house, 1 floor living once in apt. PTA pt is independent with ambulation, uses SC occasionally in community and no device within home. no difficulty with stair negotiation PTA.

## 2017-10-03 NOTE — PROGRESS NOTE ADULT - ASSESSMENT
52 YOF with PMHx of seizure, HTN, hemorrhagic CVA with right sided weakness, history of AVM (with craniotomy) now admitted with seizures.
awalandreina alert speech fluent no seziure old  left cva hx aneurysm clipped r hemepresis no seziure  on meds  will follow
leon alert  speechn luis armando  old left cva s/p aneurysm clipped r hemepresis no seizure on mkeppra  will follow
pt is sleeping no seziure on keppra old left cva r hemepresis  for tp will follow
52 YOF with PMHx of seizure, HTN, hemorrhagic CVA with right sided weakness, history of AVM (with craniotomy) now admitted with seizures.       No seizures overnight will have pt evaluate and plan for discharge
52 YOF with PMHx of seizure, HTN, hemorrhagic CVA with right sided weakness, history of AVM (with craniotomy) now admitted with seizures.       No seizures overnightwill restart fluids to bring down CK

## 2017-10-03 NOTE — PROGRESS NOTE ADULT - PROBLEM SELECTOR PROBLEM 5
History of arteriovenous malformation (AVM)

## 2017-10-03 NOTE — PHYSICAL THERAPY INITIAL EVALUATION ADULT - GENERAL OBSERVATIONS, REHAB EVAL
patient received semi supine in bed in NAD, A&Ox4, agreeable to PT eval. patient received semi supine in bed in NAD, A&Ox4, agreeable to PT eval, no c/o.

## 2017-10-03 NOTE — PHYSICAL THERAPY INITIAL EVALUATION ADULT - PERTINENT HX OF CURRENT PROBLEM, REHAB EVAL
patient is a 51yo F with PMH significant for L hemorrhagic CVA, residual R sided weakness, AVM, craniotomy and seizures; admitted 9/29 with seizure lasting ~15 min as per daughter. No seizure activity while in hospital with Daryl's paralysis improving.

## 2017-10-03 NOTE — PROGRESS NOTE ADULT - PROBLEM SELECTOR PLAN 1
Neurology consult appreciated  - Seizure precautions  - Keppra 750mg po   -Trileptal 300 bid   - Speech and Swallow cleared for diet
Neurology consult appreciated  - Seizure precautions  - Keppra 750mg po   - Speech and Swallow cleared for diet
Neurology consult appreciated  - Seizure precautions  - Keppra 750mg IV BID   Keppra level pending  - Speech and Swallow

## 2017-10-03 NOTE — PROGRESS NOTE ADULT - PROBLEM SELECTOR PROBLEM 2
17
Daryl's paralysis (postepileptic)

## 2017-10-04 ENCOUNTER — TRANSCRIPTION ENCOUNTER (OUTPATIENT)
Age: 52
End: 2017-10-04

## 2017-10-04 VITALS
OXYGEN SATURATION: 98 % | DIASTOLIC BLOOD PRESSURE: 90 MMHG | SYSTOLIC BLOOD PRESSURE: 153 MMHG | HEART RATE: 60 BPM | TEMPERATURE: 98 F | RESPIRATION RATE: 16 BRPM

## 2017-10-04 LAB
ANION GAP SERPL CALC-SCNC: 9 MMOL/L — SIGNIFICANT CHANGE UP (ref 5–17)
BUN SERPL-MCNC: 11 MG/DL — SIGNIFICANT CHANGE UP (ref 7–23)
CALCIUM SERPL-MCNC: 8.4 MG/DL — LOW (ref 8.5–10.1)
CHLORIDE SERPL-SCNC: 115 MMOL/L — HIGH (ref 96–108)
CK SERPL-CCNC: 2059 U/L — HIGH (ref 26–192)
CO2 SERPL-SCNC: 23 MMOL/L — SIGNIFICANT CHANGE UP (ref 22–31)
CREAT SERPL-MCNC: 0.79 MG/DL — SIGNIFICANT CHANGE UP (ref 0.5–1.3)
GLUCOSE SERPL-MCNC: 80 MG/DL — SIGNIFICANT CHANGE UP (ref 70–99)
HCT VFR BLD CALC: 37.8 % — SIGNIFICANT CHANGE UP (ref 34.5–45)
HGB BLD-MCNC: 12.4 G/DL — SIGNIFICANT CHANGE UP (ref 11.5–15.5)
MCHC RBC-ENTMCNC: 31.8 PG — SIGNIFICANT CHANGE UP (ref 27–34)
MCHC RBC-ENTMCNC: 32.7 GM/DL — SIGNIFICANT CHANGE UP (ref 32–36)
MCV RBC AUTO: 97.1 FL — SIGNIFICANT CHANGE UP (ref 80–100)
PLATELET # BLD AUTO: 223 K/UL — SIGNIFICANT CHANGE UP (ref 150–400)
POTASSIUM SERPL-MCNC: 3.6 MMOL/L — SIGNIFICANT CHANGE UP (ref 3.5–5.3)
POTASSIUM SERPL-SCNC: 3.6 MMOL/L — SIGNIFICANT CHANGE UP (ref 3.5–5.3)
RBC # BLD: 3.89 M/UL — SIGNIFICANT CHANGE UP (ref 3.8–5.2)
RBC # FLD: 12.1 % — SIGNIFICANT CHANGE UP (ref 11–15)
SODIUM SERPL-SCNC: 147 MMOL/L — HIGH (ref 135–145)
WBC # BLD: 6.6 K/UL — SIGNIFICANT CHANGE UP (ref 3.8–10.5)
WBC # FLD AUTO: 6.6 K/UL — SIGNIFICANT CHANGE UP (ref 3.8–10.5)

## 2017-10-04 PROCEDURE — 99239 HOSP IP/OBS DSCHRG MGMT >30: CPT

## 2017-10-04 RX ORDER — ASPIRIN/CALCIUM CARB/MAGNESIUM 324 MG
1 TABLET ORAL
Qty: 30 | Refills: 0
Start: 2017-10-04 | End: 2017-11-03

## 2017-10-04 RX ORDER — LEVETIRACETAM 250 MG/1
1 TABLET, FILM COATED ORAL
Qty: 60 | Refills: 3
Start: 2017-10-04 | End: 2018-01-31

## 2017-10-04 RX ORDER — OXCARBAZEPINE 300 MG/1
1 TABLET, FILM COATED ORAL
Qty: 60 | Refills: 3
Start: 2017-10-04 | End: 2018-01-31

## 2017-10-04 RX ADMIN — LEVETIRACETAM 750 MILLIGRAM(S): 250 TABLET, FILM COATED ORAL at 06:21

## 2017-10-04 RX ADMIN — SODIUM CHLORIDE 125 MILLILITER(S): 9 INJECTION, SOLUTION INTRAVENOUS at 06:21

## 2017-10-04 RX ADMIN — LISINOPRIL 40 MILLIGRAM(S): 2.5 TABLET ORAL at 06:21

## 2017-10-04 RX ADMIN — INFLUENZA VIRUS VACCINE 0.5 MILLILITER(S): 15; 15; 15; 15 SUSPENSION INTRAMUSCULAR at 10:52

## 2017-10-04 RX ADMIN — Medication 81 MILLIGRAM(S): at 11:00

## 2017-10-04 RX ADMIN — OXCARBAZEPINE 300 MILLIGRAM(S): 300 TABLET, FILM COATED ORAL at 06:21

## 2017-10-04 NOTE — DISCHARGE NOTE ADULT - HOSPITAL COURSE
History and Physical:   Source of Information	Chart(s)	  Outpatient Providers	PMD: Dr. Olmos Neurologist: Dr. Sheth	       History of Present Illness:  Chief Complaint/Reason for Admission: seizures	  History of Present Illness: 	  52 YOF with PMHx of HTN, hemorrhagic CVA with right sided weakness, history of AVM (with craniotomy) and history of seizures BIB EMS for one episode of seizure. As per chart the daughter stated seizure lasted longer than 15 minutes until she medicated with Versed by EMS. Daughter reports that patient always has extended postictal period with right sided facial droop that eventual resolves. Pt now post ictal, lethargic, unable to obtain full history from pt, continues to go in and out of sleep. However states she has no complaints. States last seizure episode was about 3-4months ago. Otherwise denies cp, sob, palpitations, N/V, abd pain, headache, dizziness.    In ED labs show hypokalemia: 2.8. Repleted. Elevated Lactate: 4.2. Given 1L bolus. Troponin: 0.192. UA with moderate bacteria and ketones. CT Head negative for acute changes, left craniotomy. Keppra 1g given in ED. CXR pending.    < from: CT Head No Cont (09.29.17 @ 21:54) >  IMPRESSION:     Left pterional craniotomy with subjacent encephalomalacia/gliosis and   chronic right frontal lobe infarct.    No intracranial hemorrhage or mass effect.    < end of copied text >  Creatine Kinase, Serum in AM (10.04.17 @ 06:42)    Creatine Kinase, Serum: 2059 U/L    Creatine Kinase, Serum: 2776 U/L (10.03.17 @ 07:42)    Assessment and Plan:   · Assessment		  52 YOF with PMHx of seizure, HTN, hemorrhagic CVA with right sided weakness, history of AVM (with craniotomy) now admitted with seizures.       No seizures overnightwill restart fluids to bring down CK       Problem/Plan - 1:  ·  Problem: Status epilepticus.  Plan: Neurology consult appreciated  - Seizure precautions  - Keppra 750mg po   -Trileptal 300 bid   - Speech and Swallow cleared for diet.      Problem/Plan - 2:  ·  Problem: Daryl's paralysis (postepileptic).  Plan: - Continue to monitor.      Problem/Plan - 3:  ·  Problem: HTN (hypertension).  Plan: - Continue to monitor.      Problem/Plan - 4:  ·  Problem: CVA, old, facial weakness.  Plan: - Continue to monitor.      Problem/Plan - 5:  ·  Problem: History of arteriovenous malformation (AVM).  Plan: - Continue to monitor.      Problem/Plan - 6:  Problem: Non-traumatic rhabdomyolysis. Plan: High CPK secondary to seizure ivf continued.     Problem/Plan - 7:  ·  Problem: Troponin level elevated.  Plan: Cardiology evaluation appreciated- high trop - no AMI per card.      Problem/Plan - 8:  ·  Problem: Hypokalemia.  Plan: replete prn.     Patient was discharged home with follow up with her neurologist medications renewed     34 minutes taken to prepare this discharge

## 2017-10-04 NOTE — DISCHARGE NOTE ADULT - PATIENT PORTAL LINK FT
“You can access the [facility] Patient Portal, offered by [facility], by registering with the following website: [email or web address]”

## 2017-10-04 NOTE — DISCHARGE NOTE ADULT - PLAN OF CARE
no seizure follow up with your neurologist to have follow up creatinine kinase continued down trending numbers

## 2017-10-04 NOTE — DISCHARGE NOTE ADULT - MEDICATION SUMMARY - MEDICATIONS TO TAKE
I will START or STAY ON the medications listed below when I get home from the hospital:    aspirin 81 mg oral delayed release tablet  -- 1 tab(s) by mouth once a day  -- Indication: For CVA, old, facial weakness    lisinopril 40 mg oral tablet  -- 1 tab(s) by mouth once a day  -- Indication: For HTN (hypertension)    levETIRAcetam 750 mg oral tablet  -- 1 tab(s) by mouth 2 times a day  -- Indication: For STATUS EPILEPTICUS, CARMINE'S PARALYSIS (POST EPILEPTIC)    OXcarbazepine 300 mg oral tablet  -- 1 tab(s) by mouth 2 times a day  -- Indication: For STATUS EPILEPTICUS, CARMINE'S PARALYSIS (POST EPILEPTIC)    OLANZapine 20 mg oral tablet  -- 1 tab(s) by mouth once a day  -- Indication: For Seizure

## 2017-10-04 NOTE — PROGRESS NOTE ADULT - SUBJECTIVE AND OBJECTIVE BOX
Assessment:  Seizure  Troponin mild elevation, normal CPK, TELE noted  Neuro noted  Not AMI, CV stable
Assessment:  Seizure  Troponin mild elevation, normal CPK, TELE noted  Neuro noted  Not AMI, CV stable  DC
CHIEF COMPLAINT/INTERVAL HISTORY:    Patient is a 52y old  Female who presents with a chief complaint of seizures (30 Sep 2017 04:33)      HPI:  52 YOF with PMHx of HTN, hemorrhagic CVA with right sided weakness, history of AVM (with craniotomy) and history of seizures BIB EMS for one episode of seizure. As per chart the daughter stated seizure lasted longer than 15 minutes until she medicated with Versed by EMS. Daughter reports that patient always has extended postictal period with right sided facial droop that eventual resolves. Pt now post ictal, lethargic, unable to obtain full history from pt, continues to go in and out of sleep. However states she has no complaints. States last seizure episode was about 3-4months ago. Otherwise denies cp, sob, palpitations, N/V, abd pain, headache, dizziness.    In ED labs show hypokalemia: 2.8. Repleted. Elevated Lactate: 4.2. Given 1L bolus. Troponin: 0.192. UA with moderate bacteria and ketones. CT Head negative for acute changes, left craniotomy. Keppra 1g given in ED. CXR pending.    ED attending Dr. Staton d/w Dr. Del Rio who admitted pt. (30 Sep 2017 04:33)    Overnight issues  patient denies any headache  Denies nay nausea vomiting  No muscle pain   No weakness  feeling some what better          SUBJECTIVE & OBJECTIVE: Pt seen and examined at bedside.   ROS:  CONSTITUTIONAL: No fever, weight loss, or fatigue  EYES: No eye pain, visual disturbances, or discharge  ENMT:  No difficulty hearing, tinnitus, vertigo; No sinus or throat pain  NECK: No pain or stiffness  RESPIRATORY: No cough, wheezing, chills or hemoptysis; No shortness of breath  CARDIOVASCULAR: No chest pain, palpitations, dizziness, or leg swelling  GASTROINTESTINAL: No abdominal or epigastric pain. No nausea, vomiting, or hematemesis; No diarrhea or constipation. No melena or hematochezia.  GENITOURINARY: No dysuria, frequency, hematuria, or incontinence  NEUROLOGICAL: No headaches, memory loss, loss of strength, numbness, or tremors  SKIN: No itching, burning, rashes, or lesions   LYMPH NODES: No enlarged glands  ENDOCRINE: No heat or cold intolerance; No hair loss  MUSCULOSKELETAL: No joint pain or swelling; No muscle, back, or extremity pain  PSYCHIATRIC: No depression, anxiety, mood swings, or difficulty sleeping  HEME/LYMPH: No easy bruising, or bleeding gums  ALLERGY AND IMMUNOLOGIC: No hives or eczema  ICU Vital Signs Last 24 Hrs  T(C): 37 (01 Oct 2017 05:08), Max: 37.7 (30 Sep 2017 16:20)  T(F): 98.6 (01 Oct 2017 05:08), Max: 99.8 (30 Sep 2017 16:20)  HR: 58 (01 Oct 2017 05:08) (55 - 72)  BP: 147/86 (01 Oct 2017 05:08) (132/82 - 147/86)  BP(mean): --  ABP: --  ABP(mean): --  RR: 18 (01 Oct 2017 05:08) (16 - 18)  SpO2: 98% (01 Oct 2017 05:08) (98% - 98%)        MEDICATIONS  (STANDING):  levETIRAcetam  IVPB 750 milliGRAM(s) IV Intermittent every 12 hours  aspirin enteric coated 81 milliGRAM(s) Oral daily  influenza   Vaccine 0.5 milliLiter(s) IntraMuscular once  potassium chloride    Tablet ER 40 milliEquivalent(s) Oral every 4 hours  sodium chloride 0.9%. 1000 milliLiter(s) (60 mL/Hr) IV Continuous <Continuous>    MEDICATIONS  (PRN):        PHYSICAL EXAM:    GENERAL: NAD, well-groomed, well-developed  HEAD:  Atraumatic, Normocephalic  EYES: EOMI, PERRLA, conjunctiva and sclera clear  ENMT: Moist mucous membranes  NECK: Supple, No JVD  NERVOUS SYSTEM:  Alert & Oriented X3, Moving all 4 limbs  CHEST/LUNG: Clear to auscultation bilaterally; No rales, rhonchi, wheezing, or rubs  HEART: Regular rate and rhythm; No murmurs, rubs, or gallops  ABDOMEN: Soft, Nontender, Nondistended; Bowel sounds present  EXTREMITIES:  2+ Peripheral Pulses, No clubbing, cyanosis, or edema    LABS:                        12.0   8.8   )-----------( 217      ( 01 Oct 2017 07:38 )             35.5     10    143  |  109<H>  |  15  ----------------------------<  77  3.4<L>   |  25  |  0.87    Ca    8.3<L>      01 Oct 2017 07:38    TPro  7.1  /  Alb  3.6  /  TBili  0.5  /  DBili  x   /  AST  18  /  ALT  16  /  AlkPhos  69  09-30    PT/INR - ( 29 Sep 2017 21:25 )   PT: 12.8 sec;   INR: 1.17 ratio         PTT - ( 29 Sep 2017 21:25 )  PTT:30.1 sec  Urinalysis Basic - ( 30 Sep 2017 21:07 )    Color: Yellow / Appearance: x / S.020 / pH: x  Gluc: x / Ketone: Small  / Bili: Small / Urobili: 4 mg/dL   Blood: x / Protein: 30 mg/dL / Nitrite: Negative   Leuk Esterase: Trace / RBC: 11-25 /HPF / WBC 3-5   Sq Epi: x / Non Sq Epi: Moderate / Bacteria: x        CAPILLARY BLOOD GLUCOSE          RECENT CULTURES:      RADIOLOGY & ADDITIONAL TESTS:  Imaging Personally Reviewed:  [ ] YES      Consultant(s) Notes Reviewed:  [ ] YES     Care Discussed with [ ] Consultants [X ] Patient [ ] Family  [ ]    [x ]  Other; RN  HEALTH ISSUES - PROBLEM Dx:  History of arteriovenous malformation (AVM): History of arteriovenous malformation (AVM)  CVA, old, facial weakness: CVA, old, facial weakness  HTN (hypertension): HTN (hypertension)  Daryl's paralysis (postepileptic): Daryl&#x27;s paralysis (postepileptic)  Status epilepticus: Status epilepticus  Seizure: Seizure        DVT/GI ppx  Discussed with pt @ bedside
Patient is a 52y old  Female who presents with a chief complaint of seizures (30 Sep 2017 04:33)      INTERVAL HPI/OVERNIGHT EVENTS: pleasant no seizures no complaints today     MEDICATIONS  (STANDING):  levETIRAcetam  IVPB 750 milliGRAM(s) IV Intermittent every 12 hours  aspirin enteric coated 81 milliGRAM(s) Oral daily  influenza   Vaccine 0.5 milliLiter(s) IntraMuscular once  lisinopril 40 milliGRAM(s) Oral daily  OLANZapine 20 milliGRAM(s) Oral daily    MEDICATIONS  (PRN):      Allergies    No Known Allergies    Intolerances        REVIEW OF SYSTEMS:  CONSTITUTIONAL: No fever, weight loss, or fatigue  EYES: No eye pain, visual disturbances, or discharge  ENMT:  No difficulty hearing, tinnitus, vertigo; No sinus or throat pain  NECK: No pain or stiffness  BREASTS: No pain, masses, or nipple discharge  RESPIRATORY: No cough, wheezing, chills or hemoptysis; No shortness of breath  CARDIOVASCULAR: No chest pain, palpitations, dizziness, or leg swelling  GASTROINTESTINAL: No abdominal or epigastric pain. No nausea, vomiting, or hematemesis; No diarrhea or constipation. No melena or hematochezia.  GENITOURINARY: No dysuria, frequency, hematuria, or incontinence  NEUROLOGICAL: No headaches, memory loss, loss of strength, numbness, or tremors  SKIN: No itching, burning, rashes, or lesions   LYMPH NODES: No enlarged glands  ENDOCRINE: No heat or cold intolerance; No hair loss  MUSCULOSKELETAL: No joint pain or swelling; No muscle, back, or extremity pain  PSYCHIATRIC: No depression, anxiety, mood swings, or difficulty sleeping  HEME/LYMPH: No easy bruising, or bleeding gums  ALLERGY AND IMMUNOLOGIC: No hives or eczema    Vital Signs Last 24 Hrs  T(C): 37.7 (02 Oct 2017 17:07), Max: 37.7 (02 Oct 2017 17:07)  T(F): 99.8 (02 Oct 2017 17:07), Max: 99.8 (02 Oct 2017 17:07)  HR: 55 (02 Oct 2017 17:07) (55 - 64)  BP: 161/87 (02 Oct 2017 17:07) (147/85 - 161/87)  BP(mean): --  RR: 17 (02 Oct 2017 17:07) (17 - 18)  SpO2: 100% (02 Oct 2017 17:07) (98% - 100%)    PHYSICAL EXAM:  GENERAL: NAD, well-groomed, well-developed  HEAD:  evidence of craniotomy EYES: EOMI, PERRLA, conjunctiva and sclera clear  ENMT: No tonsillar erythema, exudates, or enlargement; Moist mucous membranes, Good dentition, No lesions  NECK: Supple, No JVD, Normal thyroid  NERVOUS SYSTEM:  Alert & Oriented X3, Good concentrationi  CHEST/LUNG: Clear to percussion bilaterally; No rales, rhonchi, wheezing, or rubs  HEART: Regular rate and rhythm; No murmurs, rubs, or gallops  ABDOMEN: Soft, Nontender, Nondistended; Bowel sounds present  EXTREMITIES:  2+ Peripheral Pulses, No clubbing, cyanosis, or edema  LYMPH: No lymphadenopathy noted  SKIN: No rashes or lesions    LABS:                        12.2   7.5   )-----------( 211      ( 02 Oct 2017 07:36 )             37.2     10-02    147<H>  |  118<H>  |  10  ----------------------------<  83  4.3   |  24  |  0.80    Ca    8.4<L>      02 Oct 2017 07:36        Urinalysis Basic - ( 30 Sep 2017 21:07 )    Color: Yellow / Appearance: x / S.020 / pH: x  Gluc: x / Ketone: Small  / Bili: Small / Urobili: 4 mg/dL   Blood: x / Protein: 30 mg/dL / Nitrite: Negative   Leuk Esterase: Trace / RBC: 11-25 /HPF / WBC 3-5   Sq Epi: x / Non Sq Epi: Moderate / Bacteria: x      CAPILLARY BLOOD GLUCOSE          RADIOLOGY & ADDITIONAL TESTS:    Imaging Personally Reviewed:  [ X] YES  [ ] NO    Consultant(s) Notes Reviewed:  [X ] YES  [ ] NO    Care Discussed with Consultants/Other Providers [ ] YES  [ ] NO
Patient is a 52y old  Female who presents with a chief complaint of seizures (30 Sep 2017 04:33)      INTERVAL HPI/OVERNIGHT EVENTS: no acute events overnight patient claims that she takes Trileptal which is not on med list increasing CK      MEDICATIONS  (STANDING):  aspirin enteric coated 81 milliGRAM(s) Oral daily  influenza   Vaccine 0.5 milliLiter(s) IntraMuscular once  levETIRAcetam 750 milliGRAM(s) Oral two times a day  lisinopril 40 milliGRAM(s) Oral daily  OLANZapine 20 milliGRAM(s) Oral daily  OXcarbazepine 300 milliGRAM(s) Oral two times a day  sodium chloride 0.45%. 1000 milliLiter(s) (125 mL/Hr) IV Continuous <Continuous>    MEDICATIONS  (PRN):      Allergies    No Known Allergies    Intolerances        REVIEW OF SYSTEMS:  CONSTITUTIONAL: No fever, weight loss, or fatigue  EYES: No eye pain, visual disturbances, or discharge  ENMT:  No difficulty hearing, tinnitus, vertigo; No sinus or throat pain  NECK: No pain or stiffness  BREASTS: No pain, masses, or nipple discharge  RESPIRATORY: No cough, wheezing, chills or hemoptysis; No shortness of breath  CARDIOVASCULAR: No chest pain, palpitations, dizziness, or leg swelling  GASTROINTESTINAL: No abdominal or epigastric pain. No nausea, vomiting, or hematemesis; No diarrhea or constipation. No melena or hematochezia.  GENITOURINARY: No dysuria, frequency, hematuria, or incontinence  NEUROLOGICAL: No headaches, memory loss, loss of strength, numbness, or tremors  SKIN: No itching, burning, rashes, or lesions   LYMPH NODES: No enlarged glands  ENDOCRINE: No heat or cold intolerance; No hair loss  MUSCULOSKELETAL: No joint pain or swelling; No muscle, back, or extremity pain  PSYCHIATRIC: No depression, anxiety, mood swings, or difficulty sleeping  HEME/LYMPH: No easy bruising, or bleeding gums  ALLERGY AND IMMUNOLOGIC: No hives or eczema    Vital Signs Last 24 Hrs  T(C): 36.6 (03 Oct 2017 17:10), Max: 37.2 (03 Oct 2017 05:23)  T(F): 97.8 (03 Oct 2017 17:10), Max: 99 (03 Oct 2017 05:23)  HR: 54 (03 Oct 2017 17:10) (52 - 58)  BP: 149/88 (03 Oct 2017 17:10) (145/97 - 174/85)  BP(mean): --  RR: 16 (03 Oct 2017 17:10) (16 - 17)  SpO2: 99% (03 Oct 2017 17:10) (98% - 99%)    PHYSICAL EXAM:  GENERAL: NAD, well-groomed, well-developed  HEAD:  Atraumatic, Normocephalic  EYES: EOMI, PERRLA, conjunctiva and sclera clear  ENMT: No tonsillar erythema, exudates, or enlargement; Moist mucous membranes, Good dentition, No lesions  NECK: Supple, No JVD, Normal thyroid  NERVOUS SYSTEM:  Alert & Oriented X3, Good concentration; Motor Strength 5/5 B/L upper and lower extremities; DTRs 2+ intact and symmetric  CHEST/LUNG: Clear to percussion bilaterally; No rales, rhonchi, wheezing, or rubs  HEART: Regular rate and rhythm; No murmurs, rubs, or gallops  ABDOMEN: Soft, Nontender, Nondistended; Bowel sounds present  EXTREMITIES:  2+ Peripheral Pulses, No clubbing, cyanosis, or edema  LYMPH: No lymphadenopathy noted  SKIN: No rashes or lesions    LABS:                        12.5   7.0   )-----------( 233      ( 03 Oct 2017 07:42 )             37.8     10-03    146<H>  |  113<H>  |  11  ----------------------------<  80  4.0   |  23  |  0.80    Ca    8.9      03 Oct 2017 07:42  Mg     2.4     10-03    TPro  7.0  /  Alb  3.4  /  TBili  0.5  /  DBili  x   /  AST  68<H>  /  ALT  23  /  AlkPhos  60  10-03        CAPILLARY BLOOD GLUCOSE          RADIOLOGY & ADDITIONAL TESTS:    Imaging Personally Reviewed:  [ ] YES  [ ] NO    Consultant(s) Notes Reviewed:  [ ] YES  [ ] NO    Care Discussed with Consultants/Other Providers [ ] YES  [ ] NO

## 2017-10-04 NOTE — DISCHARGE NOTE ADULT - CARE PLAN
Principal Discharge DX:	Seizure  Goal:	no seizure  Instructions for follow-up, activity and diet:	follow up with your neurologist  Secondary Diagnosis:	CVA, old, facial weakness  Instructions for follow-up, activity and diet:	follow up with your neurologist  Secondary Diagnosis:	Non-traumatic rhabdomyolysis  Goal:	continued down trending numbers  Instructions for follow-up, activity and diet:	to have follow up creatinine kinase

## 2017-10-04 NOTE — PROGRESS NOTE ADULT - PROVIDER SPECIALTY LIST ADULT
Cardiology
Neurology
Neurology
Cardiology
Neurology
Hospitalist

## 2017-10-04 NOTE — DISCHARGE NOTE ADULT - PROVIDER TOKENS
FREE:[LAST:[neuroligist],FIRST:[your],PHONE:[(   )    -],FAX:[(   )    -],ADDRESS:[Patient has info]]

## 2017-10-10 DIAGNOSIS — E87.6 HYPOKALEMIA: ICD-10-CM

## 2017-10-10 DIAGNOSIS — M62.82 RHABDOMYOLYSIS: ICD-10-CM

## 2017-10-10 DIAGNOSIS — I10 ESSENTIAL (PRIMARY) HYPERTENSION: ICD-10-CM

## 2017-10-10 DIAGNOSIS — G83.84 TODD'S PARALYSIS (POSTEPILEPTIC): ICD-10-CM

## 2017-10-10 DIAGNOSIS — R56.9 UNSPECIFIED CONVULSIONS: ICD-10-CM

## 2017-10-10 DIAGNOSIS — I69.351 HEMIPLEGIA AND HEMIPARESIS FOLLOWING CEREBRAL INFARCTION AFFECTING RIGHT DOMINANT SIDE: ICD-10-CM

## 2017-10-10 DIAGNOSIS — G40.901 EPILEPSY, UNSPECIFIED, NOT INTRACTABLE, WITH STATUS EPILEPTICUS: ICD-10-CM

## 2019-02-05 NOTE — H&P ADULT - NSTOBACCOSCREENHP_GEN_A_CS
"Occupational Therapy   Re-evaluation    Name: Barbara Rizo  MRN: [id number]  Admitting Diagnosis:  Atrial fibrillation with rapid ventricular response 3 Days Post-Op    Recommendations:     Discharge Recommendations: (Home with family and HH OT)  Discharge Equipment Recommendations:  none  Barriers to discharge:  None    Assessment:     Barbara Rizo is a 79 y.o. female with a medical diagnosis of Atrial fibrillation with rapid ventricular response.  She presents with decreased ability to perform self care and functional mobility 2* generalized weakness and decreased endurance..  Performance deficits affecting function are impaired endurance, weakness, impaired self care skills, gait instability, impaired functional mobilty, impaired balance, decreased upper extremity function, decreased safety awareness, pain, impaired cardiopulmonary response to activity.      Rehab Prognosis:  good; patient would benefit from acute skilled OT services to address these deficits and reach maximum level of function.       Plan:     Patient to be seen 3 x/week to address the above listed problems via self-care/home management, therapeutic activities, therapeutic exercises  · Plan of Care Expires: 02/19/19  · Plan of Care Reviewed with: patient    Subjective     Chief Complaint: ribs hurt from coughing  Patient/Family stated goals: get stronger to go home    Pain/Comfort:  · Pain Rating 1: (yes-did not rate)  · Location 1: ("rib pain 2* coughing")  · Pain Addressed 1: Cessation of Activity, Reposition    Objective:     Communicated with: nurseLien prior to session.  Patient found HOB elevated with: george catheter, telemetry, peripheral IV, oxygen upon OT entry to room.    General Precautions: Standard, fall   Orthopedic Precautions:N/A   Braces: N/A     Occupational Performance:    Bed Mobility:    · Patient completed Supine to Sit with stand by assistance  · Patient completed Sit to Supine with contact guard assistance " and with leg lift    Functional Mobility/Transfers:  · Patient completed Sit <> Stand Transfer with contact guard assistance  with  rolling walker   · Functional Mobility: The patient side stepped along the EOB using a RW with CGA . The patient refused to step to the chair 2* c/o fatigue.    Activities of Daily Living:  · Grooming: set up assistance to brush her teeth and hand and wash face while seated on the EOB    · Lower Body Dressing: dependence to don/doff socks    Cognitive/Visual Perceptual:  Cognitive/Psychosocial Skills:     -       Oriented to: Person, Place and Situation   -       Follows Commands/attention:Follows two-step commands  -       Communication: clear/fluent  -       Memory: No Deficits noted  -       Safety awareness/insight to disability: impaired   -       Mood/Affect/Coping skills/emotional control: Appropriate to situation    Physical Exam:  Balance: -       fair+  Postural examination/scapula alignment:    -       No postural abnormalities identified  Skin integrity: Visible skin intact  Edema:  None noted  Upper Extremity Range of Motion:     -       Right Upper Extremity: WFL  -       Left Upper Extremity: WFL  Upper Extremity Strength: -       Right Upper Extremity: WFL  -       Left Upper Extremity: WFL    AMPA 6 Click:  AMPA Total Score: 19    Treatment & Education:  Education:  The patient participated in OT with encouragement. The patient perform grooming tasks while seated on the EOB. The patient was educated re: OT role and POC.    Patient left HOB elevated with all lines intact, call button in reach, bed alarm on and nurseCarline notified    GOALS:   Multidisciplinary Problems     Occupational Therapy Goals        Problem: Occupational Therapy Goal    Goal Priority Disciplines Outcome Interventions   Occupational Therapy Goal     OT, PT/OT Ongoing (interventions implemented as appropriate)    Description:  Goals to be met by: 2/19/19    Patient will increase functional  independence with ADLs by performing:    UE Dressing with Modified [place]aufman and Supervision.  Grooming while standing at sink with Modified Kaufman and Supervision.  Toileting from toilet with Supervision for hygiene and clothing management.   Supine to sit with Modified Kaufman and Supervision.  Step transfer with Supervision  Toilet transfer to toilet with Supervision.  Upper extremity exercise program x15 reps per handout, with assistance as needed.                       History:     Past Medical History:   Diagnosis Date    A-fib     Diabetes mellitus type II     Fatty liver     GERD (gastroesophageal reflux disease)     Hemochromatosis     Hyperlipidemia     Hypertension     Vaginal delivery     x2    Vitamin D deficiency disease     Wears partial dentures     upper removable bridge       Past Surgical History:   Procedure Laterality Date    ARTHROPLASTY, KNEE, TOTAL Left 5/13/2013    [facility]formed [facility] at [facility] OR    ARTHROPLASTY-KNEE Right 8/3/2015    [facility]formed by [facility] at [facility] OR    BREAST BIOPSY      CHEST DRAINAGE N/A 9/18/2015    Performed by Bonnie Surgeon at [facility] BONNIE    CHOLECYSTECTOMY  08/2015    CHOLECYSTECTOMY-LAPAROSCOPIC N/A 8/26/2015    Performed by Bony Alexander [facility] at [facility] OR    CYSTOSCOPY - URODYNAMICS FLOW STUDY  (C-ARM) N/A 11/4/2016    Performed by Reina Camp MD at [facility]    ESOPHAGOGASTRODUODENOSCOPY (EGD) Left 10/15/2015    Performed by Rolando Robert MD at [facility] ENDO    EYE SURGERY      Cat Ext  OU    HYSTERECTOMY      partial due to uterine fibroids    INCISION AND DRAINAGE (I&D), ABSCESS Right 9/14/2015    Performed by Bonnie Surgeon at [facility] B[facility]NIE    TOTAL KNEE ARTHROPLASTY Right 2015    left knee done 5/2013    TRANSESOPHAGEAL ECHOCARDIOGRAM (TALHA) N/A 9/21/2015    Performed by Amauri Lomas MD at [facility] CATH LAB    TRANSESOPHAGEAL ECHOCARDIOGRAM WITH POSSIBLE CARDIOVERSION (TALHA W/ POSS  "CARDIOVERSION) N/A 2019    Performed by Sun[facility]o [facility] at [facility] CATH LAB       Clinical Decision Makin.  OT Low:  "Pt evaluation falls under low complexity for evaluation coding due to performance deficits noted in 1-3 areas as stated above and 0 co-morbities affecting current functional status. Data obtained from problem focused assessments. No modifications or assistance was required for completion of evaluation. Only brief occupational profile and history review completed."     Time Tracking:     OT Date of Treatment: 19  OT Start Time: 1620  OT Stop Time: 1636  OT Total Time (min): 16 min    Billable Minutes:Re-eval 16    Marykwaku Cox, OT  2019      " No

## 2019-02-13 NOTE — PATIENT PROFILE ADULT. - AS SC BRADEN MOISTURE
Alyce Batista is a 44 y.o. female presents for   Chief Complaint   Patient presents with   • Memory Loss                CHIEF COMPLAINT:  Neurology consultation from Dr. Yovani Daniels with multiple sympotms    History of Present Illness  A 44 DG, right-handed, comes unaccompanied  History reviewed with her, Epic chart and the history forms she had filled out.  She has multiplicity of symptoms, over the duration of about 2 years  which include:  Memory loss  Fatigue  Difficulty concentration  Numbness and tingling in toes and fingers  Headaches: used to have migraines relieved with Topamax, now has been discontinued    Overall these symptoms come and go  No progression in any of the symptoms  No other associated neurological symptoms except she talks in her sleep and awakens her children up  Doubtful that she awakes refreshed and ready to go although she is a hard worker.  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  Never symptoms in space or time to meet MS diagnostic criteria.    Other symptoms:   Bruises easily  Constipation: described as no urgency to defecate yet no actual diarrhea nor constipation nor loss of bowel control  Difficulty emptying her bladder where she has to stand up and bend over to completely empty her bladder. NO symptoms of UTI. No incontinence or urinary urgency or  retention.  In relation to bowel and bladder complaints she has no back pain, radicular symptoms or any progressive neurological symptoms.  She is not sexually active      I have completed the ROS  The following portions of the patient's history were reviewed and updated as appropriate: allergies, current medications, past family history, past medical history, past social history, past surgical history and problem list.        Review of Systems   Constitutional: Positive for diaphoresis and fatigue. Negative for activity  change, appetite change, chills, fever and unexpected weight change.   HENT: Negative for congestion, dental problem, drooling, ear discharge, ear pain, facial swelling, hearing loss, mouth sores, nosebleeds, postnasal drip, rhinorrhea, sinus pressure, sinus pain, sneezing, sore throat, tinnitus, trouble swallowing and voice change.    Eyes: Negative.    Respiratory: Negative.    Cardiovascular: Negative.    Gastrointestinal: Positive for constipation. Negative for abdominal distention, abdominal pain, anal bleeding, blood in stool, diarrhea, nausea, rectal pain and vomiting.   Endocrine: Negative.    Genitourinary: Positive for frequency. Negative for decreased urine volume, difficulty urinating, dyspareunia, dysuria, enuresis, flank pain, genital sores, hematuria, menstrual problem, pelvic pain, urgency, vaginal bleeding, vaginal discharge and vaginal pain.   Musculoskeletal: Positive for arthralgias and neck pain. Negative for back pain, gait problem, joint swelling and myalgias.   Skin: Negative.    Allergic/Immunologic: Negative.    Neurological: Positive for dizziness and headaches. Negative for tremors, seizures, syncope, facial asymmetry, speech difficulty, weakness, light-headedness and numbness.   Hematological: Negative for adenopathy. Bruises/bleeds easily.   Psychiatric/Behavioral: Positive for decreased concentration. Negative for agitation, behavioral problems, confusion, dysphoric mood, hallucinations, self-injury, sleep disturbance and suicidal ideas. The patient is not nervous/anxious and is not hyperactive.      I reviewed PMH. FH. SH, medications    Past Medical History:   Diagnosis Date   • Concussion    • Depression    • Dysthymia    • Environmental allergies    • Head injury    • Headache    • Headache, tension-type    • Hirsutism    • Hypertension    • Memory loss    • Migraine          Social History     Socioeconomic History   • Marital status:      Spouse name: Not on file   •  Number of children: Not on file   • Years of education: Not on file   • Highest education level: Not on file   Social Needs   • Financial resource strain: Not on file   • Food insecurity - worry: Not on file   • Food insecurity - inability: Not on file   • Transportation needs - medical: Not on file   • Transportation needs - non-medical: Not on file   Occupational History   • Not on file   Tobacco Use   • Smoking status: Never Smoker   • Smokeless tobacco: Never Used   Substance and Sexual Activity   • Alcohol use: Yes   • Drug use: No   • Sexual activity: Defer   Other Topics Concern   • Not on file   Social History Narrative   • Not on file          Family History   Problem Relation Age of Onset   • Hypertension Mother    • Migraines Mother    • Hyperlipidemia Father    • Hypertension Father    • Diabetes Maternal Grandmother    • Hypertension Maternal Grandfather    • Ataxia Paternal Grandmother    • Dementia Paternal Grandmother    • Parkinsonism Paternal Grandmother    • Arthritis Paternal Grandfather    • Ataxia Paternal Grandfather    • Neuropathy Paternal Grandfather            Current Outpatient Medications:   •  Cetirizine HCl 10 MG capsule, Take 10 mg by mouth Daily., Disp: , Rfl:   •  docusate sodium (COLACE) 100 MG capsule, Take 2 capsules by mouth Daily., Disp: , Rfl:   •  ferrous sulfate 325 (65 FE) MG tablet, Take 1 tablet by mouth Daily., Disp: , Rfl:   •  FLUoxetine (PROzac) 20 MG capsule, TAKE 1 CAPSULE BY MOUTH TWO TIMES DAILY, Disp: 180 capsule, Rfl: 3  •  losartan (COZAAR) 25 MG tablet, Take 1 tablet by mouth Daily., Disp: 90 tablet, Rfl: 3  •  norethindrone (MICRONOR) 0.35 MG tablet, Take 0.35 mg by mouth Daily., Disp: , Rfl:   •  phentermine 37.5 MG capsule, Take 37.5 mg by mouth Every Morning., Disp: , Rfl:   •  spironolactone (ALDACTONE) 50 MG tablet, Take 50 mg by mouth., Disp: , Rfl:   •  Calcium Carb-Cholecalciferol (CALCIUM 600 + D) 600-200 MG-UNIT tablet, Take 1 tablet by mouth  Daily., Disp: , Rfl:   •  docusate sodium (COLACE) 100 MG capsule, Take 2 capsules by mouth daily., Disp: , Rfl:   •  fexofenadine (WAL-FEX ALLERGY) 180 MG tablet, Take  by mouth., Disp: , Rfl:   •  FLUoxetine (PROzac) 20 MG capsule, TAKE 1 CAPSULE BY MOUTH TWO TIMES DAILY, Disp: 180 capsule, Rfl: 0  •  FLUoxetine (PROzac) 20 MG capsule, Take 40 mg by mouth Every Night., Disp: , Rfl:   •  metoprolol succinate XL (TOPROL-XL) 50 MG 24 hr tablet, TAKE 1 TABLET BY MOUTH  DAILY, Disp: 90 tablet, Rfl: 2  •  topiramate (TOPAMAX) 50 MG tablet, Take 1 tablet by mouth at  bedtime, Disp: 90 tablet, Rfl: 3  •  VITAMIN D, CHOLECALCIFEROL, PO, Take 400 Units by mouth., Disp: , Rfl:       Vitals:    02/13/19 1444   BP: 126/68   Pulse: 76   SpO2: 98%         Physical Exam   Constitutional: She is oriented to person, place, and time. She appears well-developed and well-nourished. No distress.   HENT:   Head: Normocephalic and atraumatic.   Mouth/Throat: Oropharynx is clear and moist. No oropharyngeal exudate.   Eyes: EOM are normal. Pupils are equal, round, and reactive to light. Right eye exhibits no discharge. Left eye exhibits no discharge.   Neurological: She is oriented to person, place, and time. She has normal strength. She has a normal Finger-Nose-Finger Test, a normal Heel to Shin Test, a normal Romberg Test and a normal Tandem Gait Test. Gait normal.   Reflex Scores:       Tricep reflexes are 1+ on the right side and 1+ on the left side.       Bicep reflexes are 1+ on the right side and 1+ on the left side.       Brachioradialis reflexes are 1+ on the right side and 1+ on the left side.       Patellar reflexes are 1+ on the right side and 1+ on the left side.       Achilles reflexes are 1+ on the right side and 1+ on the left side.  Skin: She is not diaphoretic.   Psychiatric: She has a normal mood and affect. Her behavior is normal. Judgment and thought content normal.         Neurologic Exam     Mental Status   Oriented  to person, place, and time.   Oriented to year, month, date, day and season.   Registration: recalls 3 of 3 objects. Recall at 5 minutes: recalls 3 of 3 objects. Follows 3 step commands.   Attention: normal. Concentration: normal.   Level of consciousness: alert  Knowledge: consistent with education. Able to perform simple calculations.   Able to name object. Able to read. Able to repeat. Able to write. Normal comprehension.     Cranial Nerves     CN II   Visual fields full to confrontation.     CN III, IV, VI   Pupils are equal, round, and reactive to light.  Extraocular motions are normal.   Right pupil: Accommodation: intact.   Left pupil: Accommodation: intact.   CN III: no CN III palsy  CN VI: no CN VI palsy  Nystagmus: none   Diplopia: none  Ophthalmoparesis: none  Upgaze: normal  Downgaze: normal  Conjugate gaze: present    CN V   Facial sensation intact.     CN VII   Facial expression full, symmetric.     CN VIII   CN VIII normal.     CN IX, X   CN IX normal.   CN X normal.     CN XI   CN XI normal.     CN XII   CN XII normal.     Motor Exam   Muscle bulk: normal  Overall muscle tone: normal  Right arm tone: normal  Left arm tone: normal  Right arm pronator drift: absent  Left arm pronator drift: absent  Right leg tone: normal  Left leg tone: normal    Strength   Strength 5/5 throughout.     Sensory Exam   Light touch normal.   Vibration normal.   Proprioception normal.   Pinprick normal.   Graphesthesia: normal  Stereognosis: normal    Gait, Coordination, and Reflexes     Gait  Gait: normal    Coordination   Romberg: negative  Finger to nose coordination: normal  Heel to shin coordination: normal  Tandem walking coordination: normal    Tremor   Resting tremor: absent  Intention tremor: absent  Action tremor: absent    Reflexes   Right brachioradialis: 1+  Left brachioradialis: 1+  Right biceps: 1+  Left biceps: 1+  Right triceps: 1+  Left triceps: 1+  Right patellar: 1+  Left patellar: 1+  Right achilles:  1+  Left achilles: 1+  Right : 1+  Left : 1+  Right Blackburn: absent  Left Blackburn: absent  Right ankle clonus: absent  Left ankle clonus: absent  Right pendular knee jerk: absent  Left pendular knee jerk: absent          Office Visit on 01/29/2019   Component Date Value Ref Range Status   • BRYNN Direct 01/29/2019 Negative  Negative Final   • RA Latex Turbid 01/29/2019 <10.0  0.0 - 13.9 IU/mL Final   • C-Reactive Protein 01/29/2019 1.6  0.0 - 4.9 mg/L Final   • CCP Antibodies IgG/IgA 01/29/2019 10  0 - 19 units Final    Comment:                           Negative               <20                            Weak positive      20 - 39                            Moderate positive  40 - 59                            Strong positive        >59     • Sed Rate 01/29/2019 22  0 - 32 mm/hr Final   • Factor V Leiden 01/29/2019 Comment   Final    Comment: Result:  Negative (no mutation found)  Factor V Leiden is a specific mutation ([id number]) in the factor  V gene that is associated with an increased risk of venous  thrombosis. Factor V Leiden is more resistant to  inactivation by activated protein C.  As a result, factor V  persists in the circulation leading to a mild hyper-  coagulable state.  The Leiden mutation accounts for 90% -  95% of APC resistance.  Factor V Leiden has been reported in  patients with deep vein thrombosis, pulmonary embolus,  central retinal vein occlusion, cerebral sinus thrombosis  and hepatic vein thrombosis. Other risk factors to be  considered in the workup for venous thrombosis include the  [id number] mutation in the factor II (prothrombin) gene,  protein S and C deficiency, and antithrombin deficiencies.  Anticardiolipin antibody and lupus anticoagulant analysis  may be appropriate for certain patients, as well as  homocysteine levels.  Contact your local [facility] for information on how to order  additional kay                           ting if desired.  **Genetic counselors are available for  health care providers to**    discuss results at [phone] ([phone]).  Methodology:  DNA analysis of the Factor V gene was performed by allele-specific  PCR. The diagnostic sensitivity and specificity is >99% for both.  Molecular-based testing is highly accurate, but as in any laboratory  test, diagnostic errors may occur. All test results must be combined  with clinical information for the most accurate interpretation.  This test was developed and its performance characteristics determined  by [facility]. It has not been cleared or approved by the Food and Drug  Administration.  References:  Calvin DRISCOLL (1996).  Clin Lab Med 16:169-186.  Nadia Olguin, PhD, FAC  Kayce Lockhart, PhD, FAC  Lorna Cummings M.S., PhD, [facility]  Maureen Solomon, PhD, [facility]  Kim Lucio, PhD, [facility]  Jacob Honeycutt PhD, [facility]     • Protime 01/29/2019 10.4  sec Final    Comment: Reference Range:  18 years and older: 9.6 - 11.5     • INR 01/29/2019 1.0  ratio Final    Comment: Reference Range:  18 years and older: 0.9 - 1.1     • aPTT 01/29/2019 26.5  sec Final    Comment: This test has not been validated for monitoring  unfractionated heparin therapy.  aPTT-based therapeutic  ranges for unfractionated heparin therapy have not been  established.  Consider ordering Heparin anti-Xa  (unfractionated).  Reference Range:  18 years and older: 22.9 - 30.2     • APTT 1:1 NP 01/29/2019 CANCELED  sec Final-Edited    Comment: Testing Not Indicated  Not indicated    Result canceled by the ancillary.     • APTT 1:1 Saline 01/29/2019 CANCELED  sec Final-Edited    Comment: Testing Not Indicated  Not indicated    Result canceled by the ancillary.     • Thrombin Time 01/29/2019 16.6  sec Final    Comment: Reference Range:  0.0 - 23.0     • DRVVT Screen Seconds 01/29/2019 30.6  sec Final    Comment: Reference Range:  <= 47.0     • DRVVT Confirm Seconds 01/29/2019 CANCELED  sec Final-Edited    Comment: Testing Not Indicated  Not indicated    Result  canceled by the ancillary.     • DRVVT/Confirm Ratio 01/29/2019 CANCELED  ratio Final-Edited    Comment: Testing Not Indicated  Not indicated    Result canceled by the ancillary.     • Hex Phosph Neut Test 01/29/2019 3  sec Final    Comment: This value is NEGATIVE.  This is a qualitative assay and is therefore reported as  positive for lupus anticoagulant or negative.  The  quantitative value is provided as an aid in diagnosis.  Reference Range:  0 - 11     • Anticardiolipin IgG 01/29/2019 <10  GPL Final    Comment: Reference Range:  Negative: <15  Indeterminate: 15 - 20  Low to medium positive: >20 - 80  High positive: >80     • Anticardiolipin IgM 01/29/2019 10  MPL Final    Comment: Reference Range:  Negative: <13  Indeterminate: 13 - 20  Low to medium positive: >20 - 80  High positive: >80     • Beta-2 Glyco 1 IgG 01/29/2019 <10  SGU Final    Comment: The reference interval reflects a 3SD or 99th percentile  interval, which is thought to represent a potentially  clinically significant result in accordance with the  International Consensus Statement on the classification  criteria for definitive antiphospholipid syndrome (APS). J  Thromb Abpl8814;4:295-306.  Reference Range:  Negative: <21     • Beta-2 Glyco 1 IgM 01/29/2019 <10  SMU Final    Comment: The reference interval reflects a 3SD or 99th percentile  interval, which is thought to represent a potentially  clinically significant result in accordance with the  International Consensus Statement on the classification  criteria for definitive antiphospholipid syndrome (APS). J  Thromb [id number];4:295-306.  Reference Range:  Negative: <33     • Beta-2 Glyco 1 IgA 01/29/2019 11  DANIELLE Final    Comment: The reference interval reflects a 3SD or 99th percentile  interval.  Reference Range:  Negative: <26     • LAC Interpretation 01/29/2019 Comment   Final    Comment: A lupus anticoagulant is not detected. All antiphospholipid  antibodies evaluated are normal. As  antibody titers may  fluctuate with time, repeat testing may be indicated.  Please contact [email or web address] if further  clarification is needed.     Office Visit on 01/14/2019   Component Date Value Ref Range Status   • WBC 01/14/2019 9.87  4.50 - 10.70 10*3/mm3 Final   • RBC 01/14/2019 4.44  3.90 - 5.20 10*6/mm3 Final   • Hemoglobin 01/14/2019 14.2  11.9 - 15.5 g/dL Final   • Hematocrit 01/14/2019 42.4  35.6 - 45.5 % Final   • MCV 01/14/2019 95.5  80.5 - 98.2 fL Final   • MCH 01/14/2019 32.0  26.9 - 32.0 pg Final   • MCHC 01/14/2019 33.5  32.4 - 36.3 g/dL Final   • RDW 01/14/2019 12.3  11.7 - 13.0 % Final   • Platelets 01/14/2019 348  140 - 500 10*3/mm3 Final   • Neutrophil Rel % 01/14/2019 56.5  42.7 - 76.0 % Final   • Lymphocyte Rel % 01/14/2019 29.4  19.6 - 45.3 % Final   • Monocyte Rel % 01/14/2019 10.1  5.0 - 12.0 % Final   • Eosinophil Rel % 01/14/2019 3.6  0.3 - 6.2 % Final   • Basophil Rel % 01/14/2019 0.4  0.0 - 1.5 % Final   • Neutrophils Absolute 01/14/2019 5.57  1.90 - 8.10 10*3/mm3 Final   • Lymphocytes Absolute 01/14/2019 2.90  0.90 - 4.80 10*3/mm3 Final   • Monocytes Absolute 01/14/2019 1.00  0.20 - 1.20 10*3/mm3 Final   • Eosinophils Absolute 01/14/2019 0.36  0.00 - 0.70 10*3/mm3 Final   • Basophils Absolute 01/14/2019 0.04  0.00 - 0.20 10*3/mm3 Final   • Immature Granulocyte Rel % 01/14/2019 0.4  0.0 - 0.5 % Final   • Immature Grans Absolute 01/14/2019 0.04* 0.00 - 0.03 10*3/mm3 Final   • Glucose 01/14/2019 103* 65 - 99 mg/dL Final   • BUN 01/14/2019 15  6 - 20 mg/dL Final   • Creatinine 01/14/2019 0.70  0.57 - 1.00 mg/dL Final   • eGFR Non  Am 01/14/2019 92  >60 mL/min/1.73 Final   • eGFR African Am 01/14/2019 111  >60 mL/min/1.73 Final   • BUN/Creatinine Ratio 01/14/2019 21.4  7.0 - 25.0 Final   • Sodium 01/14/2019 137  136 - 145 mmol/L Final   • Potassium 01/14/2019 4.6  3.5 - 5.2 mmol/L Final   • Chloride 01/14/2019 98  98 - 107 mmol/L Final   • Total CO2 01/14/2019 24.6  22.0 - 29.0  mmol/L Final   • Calcium 01/14/2019 10.0  8.6 - 10.5 mg/dL Final   • Total Protein 01/14/2019 8.3  6.0 - 8.5 g/dL Final   • Albumin 01/14/2019 4.60  3.50 - 5.20 g/dL Final   • Globulin 01/14/2019 3.7  gm/dL Final   • A/G Ratio 01/14/2019 1.2  g/dL Final   • Total Bilirubin 01/14/2019 <0.2  0.1 - 1.2 mg/dL Final   • Alkaline Phosphatase 01/14/2019 93  39 - 117 U/L Final   • AST (SGOT) 01/14/2019 25  1 - 32 U/L Final   • ALT (SGPT) 01/14/2019 26  1 - 33 U/L Final   • TSH 01/14/2019 2.220  0.270 - 4.200 mIU/mL Final   • Free T4 01/14/2019 0.94  0.93 - 1.70 ng/dL Final   • Vitamin B-12 01/14/2019 470  211 - 946 pg/mL Final   • Folate 01/14/2019 >20.00  4.78 - 24.20 ng/mL Final   • TIBC 01/14/2019 340  mcg/dL Final   • UIBC 01/14/2019 250  mcg/dL Final   • Iron 01/14/2019 90  37 - 145 mcg/dL Final   • Iron Saturation 01/14/2019 26  20 - 50 % Final   • 25 Hydroxy, Vitamin D 01/14/2019 117.9* 30.0 - 100.0 ng/ml Final    Comment: Reference Range for Total Vitamin D 25(OH)  Deficiency <20.0 ng/mL  Insufficiency 21-29 ng/mL  Sufficiency  ng/mL  Toxicity >100 ng/ml     • LH 01/14/2019 2.6  mIU/mL Final    Comment:                     Adult Female:                        Follicular phase      2.4 -  12.6                        Ovulation phase      14.0 -  95.6                        Luteal phase          1.0 -  11.4                        Postmenopausal        7.7 -  58.5     • FSH 01/14/2019 3.5  mIU/mL Final    Comment:                     Adult Female:                        Follicular phase      3.5 -  12.5                        Ovulation phase       4.7 -  21.5                        Luteal phase          1.7 -   7.7                        Postmenopausal       25.8 - 134.8              REVIEW OF STUDIES:  Multiple labs as outlined above, more than 20 of them,  were reviewed with her one by one and are all normal  I brought up the fabian MRI that was done recently (only one she briannes bruno) and reviewed with her. The  radiological DDx was discussed and pointing out the small white matter lesions explained to her why I do not believe they are primary demyelinating changes. I agree with the official reading to my own interpretation these are small vessel changes.    DIAGNOSIS, IMPRESSION AND PLAN:  Multiplicity of chronic ongoing symptoms with essentially normal examiantion and normal extensive labs as outlined above.    Her major fear is that she ahs MS. She has been through divorce and they share custody of 2 children ages 10 and 13. She is stressed out by the fact she is not as computer sauvy as some of the younger co-workers but her job performance has been good according to her.  I reviewed the DDx of MS special attention to fatigue.    She also has REM sleep disorder where she talks loudly in her sleep at times wakening he children and prior to the divorce would awaken her .  Some of her symptoms could be from a sleep disorder: she si not sure if she snores.    Ambulatory referral to DR Ortiz, [facility]    Total time 60 minutes more than 50% in explanation of the nature of her symptoms, the neurologicval loclaization, the value of the combination of the hsitoy, neurological examination and imaging in ruling in or ruling out MS. I also reviewed the most recent 2017 criteris for Dx of MS.               (4) rarely moist

## 2024-01-08 NOTE — ED PROVIDER NOTE - CARE PLAN
Principal Discharge DX:	Status epilepticus  Secondary Diagnosis:	Daryl's paralysis (postepileptic) foreign body eye

## 2024-03-13 RX ORDER — LISINOPRIL 2.5 MG/1
1 TABLET ORAL
Qty: 0 | Refills: 0 | DISCHARGE

## 2024-03-13 RX ORDER — OLANZAPINE 15 MG/1
1 TABLET, FILM COATED ORAL
Qty: 0 | Refills: 0 | DISCHARGE